# Patient Record
Sex: FEMALE | Race: WHITE | NOT HISPANIC OR LATINO | Employment: UNEMPLOYED | URBAN - METROPOLITAN AREA
[De-identification: names, ages, dates, MRNs, and addresses within clinical notes are randomized per-mention and may not be internally consistent; named-entity substitution may affect disease eponyms.]

---

## 2023-03-31 ENCOUNTER — DOCUMENTATION (OUTPATIENT)
Dept: HEMATOLOGY ONCOLOGY | Facility: CLINIC | Age: 53
End: 2023-03-31

## 2023-03-31 NOTE — PROGRESS NOTES
Intake received, chart reviewed for need of external records      Pathology requested:   From [facility] via fax [phone], phone [phone]    Images requested:  From [facility] via fax [phone], phone [phone]    Routed to consulting providers team

## 2023-04-03 ENCOUNTER — DOCUMENTATION (OUTPATIENT)
Dept: HEMATOLOGY ONCOLOGY | Facility: CLINIC | Age: 53
End: 2023-04-03

## 2023-04-03 NOTE — PROGRESS NOTES
Records received from outside facility  Outside Pathology records received from : [facility]    Records sent to Pathology attention Neal Lewis   Note routed to team

## 2023-04-04 ENCOUNTER — DOCUMENTATION (OUTPATIENT)
Dept: HEMATOLOGY ONCOLOGY | Facility: CLINIC | Age: 53
End: 2023-04-04

## 2023-04-04 NOTE — PROGRESS NOTES
Records received from outside facility  Outside Pathology/Images records received from : [facility]    Records sent to Radiology attention   [facility] Note routed to team

## 2023-04-05 ENCOUNTER — HOSPITAL ENCOUNTER (OUTPATIENT)
Dept: RADIOLOGY | Facility: HOSPITAL | Age: 53
Discharge: HOME | End: 2023-04-05
Attending: RADIOLOGY

## 2023-04-05 DIAGNOSIS — Z76.89 REFERRAL OF PATIENT WITHOUT EXAMINATION OR TREATMENT: ICD-10-CM

## 2023-04-24 ENCOUNTER — CONSULT (OUTPATIENT)
Dept: HEMATOLOGY ONCOLOGY | Facility: CLINIC | Age: 53
End: 2023-04-24

## 2023-04-24 VITALS
OXYGEN SATURATION: 97 % | HEART RATE: 105 BPM | HEIGHT: 67 IN | WEIGHT: 191 LBS | DIASTOLIC BLOOD PRESSURE: 100 MMHG | SYSTOLIC BLOOD PRESSURE: 174 MMHG | RESPIRATION RATE: 17 BRPM | BODY MASS INDEX: 29.98 KG/M2

## 2023-04-24 DIAGNOSIS — Z17.0 MALIGNANT NEOPLASM OF OVERLAPPING SITES OF BOTH BREASTS IN FEMALE, ESTROGEN RECEPTOR POSITIVE (HCC): Primary | ICD-10-CM

## 2023-04-24 DIAGNOSIS — C50.812 MALIGNANT NEOPLASM OF OVERLAPPING SITES OF BOTH BREASTS IN FEMALE, ESTROGEN RECEPTOR POSITIVE (HCC): Primary | ICD-10-CM

## 2023-04-24 DIAGNOSIS — I10 PRIMARY HYPERTENSION: ICD-10-CM

## 2023-04-24 DIAGNOSIS — C50.811 MALIGNANT NEOPLASM OF OVERLAPPING SITES OF BOTH BREASTS IN FEMALE, ESTROGEN RECEPTOR POSITIVE (HCC): Primary | ICD-10-CM

## 2023-04-24 DIAGNOSIS — G89.3 CANCER RELATED PAIN: ICD-10-CM

## 2023-04-24 DIAGNOSIS — F32.A DEPRESSION, UNSPECIFIED DEPRESSION TYPE: ICD-10-CM

## 2023-04-24 RX ORDER — LISINOPRIL 20 MG/1
20 TABLET ORAL DAILY
COMMUNITY

## 2023-04-24 RX ORDER — CETIRIZINE HYDROCHLORIDE 5 MG/1
5 TABLET ORAL DAILY
COMMUNITY

## 2023-04-24 RX ORDER — TAMOXIFEN CITRATE 20 MG/1
TABLET ORAL
COMMUNITY

## 2023-04-24 RX ORDER — GABAPENTIN 300 MG/1
300 CAPSULE ORAL 3 TIMES DAILY
COMMUNITY

## 2023-04-24 RX ORDER — VENLAFAXINE HYDROCHLORIDE 75 MG/1
75 CAPSULE, EXTENDED RELEASE ORAL DAILY
COMMUNITY

## 2023-04-24 NOTE — LETTER
April 25, 2023     Referral Self    Patient: Laurie Gomez   YOB: 1970   Date of Visit: 4/24/2023       Dear Dr Jaquan Kwok:    Thank you for referring Laurie Gomez to me for evaluation  Below are my notes for this consultation  If you have questions, please do not hesitate to call me  I look forward to following your patient along with you  Sincerely,        Nancy Hickman MD        CC: Anthony Olson MD  4/25/2023 12:18 AM  Sign when Signing Visit  Consultation - Medical Oncology   Laurie Gomez 47 y o  female MRN: [id number]  Unit/Bed#:  Encounter: [id number]  Self referral  Date of service: 4/24/2023  Reason for Consult: Bilateral breast cancers  HPI: Laurie Gomez is a 47y o  year old female  Transfer of care from [facility] to this area  Patient has moved back to this area  Records not available except report of Oncotype and that was 15  Patient states that in July 2020 she was diagnosed to have bilateral breast cancers and had bilateral mastectomies  She states she had invasive ductal carcinoma that was larger on the right side than left side  She states stage was 3 and lymph nodes were positive in right axilla but she does not know how many  After surgery she had radiation and she thinks that was given to the right side and she had that from October 2022- December 2020  Since that time she has been on tamoxifen 20 mg daily  Since surgery she has some pain in the mastectomy scars and scars were revised twice  She states gabapentin is helping and also she takes a CBD  She gives history of depression and has been on Effexor ER 75  Has  history of hypertension and seasonal allergies  Menarche at 16  She has 1 son and that was born when she was 30 and prior to that she had 2 miscarriages and had fertility treatments    Patient states her last menstrual period was the day of surgery in 2020 and 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  She quit smoking "cigarettes in 2005 after smoking 1/3 pack of cigarette a day for 20 years  No alcohol  Sister has history of skin cancer  Maternal grandfather and paternal grandmother had lung cancers and they smoked cigarettes  Patient is allergic to latex and penicillin  ROS:  Reviewed 12 systems: See symptoms in HPI  Presently no other neurological, cardiac, pulmonary, GI and  symptoms other than listed in HPI  Other symptoms are in HPI  No  fever, chills, bleeding, bone pains, skin rash, weight loss, night sweats, arthritic symptoms,  tiredness , weakness, numbness, claudication and gait problem  No frequent infections  Not unusually sensitive to heat or cold  No swelling of the ankles  No swollen glands  Patient is anxious  Historical Information   No past medical history on file  No past surgical history on file  Social History   Social History     Substance and Sexual Activity   Alcohol Use Not on file     Social History     Substance and Sexual Activity   Drug Use Not on file     Social History     Tobacco Use   Smoking Status Not on file   Smokeless Tobacco Not on file     Family History: No family history on file        Current Outpatient Medications:   •  cetirizine (ZyrTEC) 5 MG tablet, Take 5 mg by mouth daily, Disp: , Rfl:   •  gabapentin (NEURONTIN) 300 mg capsule, Take 300 mg by mouth 3 (three) times a day, Disp: , Rfl:   •  lisinopril (ZESTRIL) 20 mg tablet, Take 20 mg by mouth daily, Disp: , Rfl:   •  tamoxifen (NOLVADEX) 20 mg tablet, , Disp: , Rfl:   •  venlafaxine (EFFEXOR-XR) 75 mg 24 hr capsule, Take 75 mg by mouth daily, Disp: , Rfl:     Not on File    Physical Exam: My medical assistant Rajinder Contreras was with me in the room during examination  Vitals:    04/24/23 0947   BP: (!) 174/100   BP Location: Left arm   Patient Position: Sitting   Cuff Size: Adult   Pulse: 105   Resp: 17   SpO2: 97%   Weight: 86 6 kg (191 lb)   Height: 5' 7\" (1 702 m)     Alert, oriented, not in distress, vitals are " above and blood pressure is high, no icterus, no oral thrush, no palpable neck mass, clear lung fields, regular heart rate, abdomen  soft and non tender, no palpable abdominal mass, no ascites, no edema of ankles, no calf tenderness, no focal neurological deficit, no skin rash, no palpable lymphadenopathy in the neck and axillary areas,  no clubbing  Patient is anxious  Performance status 0  Bilateral mastectomy scars and no palpable nodule in the scars  No lymphedema  Lab Results: No reported other than Oncotype score was 15  LABS:  No results found for this or any previous visit  Imaging Studies: No report  Pathology, and Other Studies: No reported other than Oncotype score was 15      Assessment and Plan:  See diagnoses, orders instructions below   Transfer of care from [facility] to this area  Patient has moved back to this area  Records not available except report of Oncotype and that was 15  Patient states that in July 2020 she was diagnosed to have bilateral breast cancers and had bilateral mastectomies  She states she had invasive ductal carcinoma that was larger on the right side than left side  She states stage was 3 and lymph nodes were positive in right axilla but she does not know how many  After surgery she had radiation and she thinks that was given to the right side and she had that from October 2022- December 2020  Since that time she has been on tamoxifen 20 mg daily  Since surgery she has some pain in the mastectomy scars and scars were revised twice  She states gabapentin is helping and also she takes a CBD  She gives history of depression and has been on Effexor ER 75  Has  history of hypertension and seasonal allergies  Menarche at 16  She has 1 son and that was born when she was 30 and prior to that she had 2 miscarriages and had fertility treatments    Patient states her last menstrual period was the day of surgery in 2020 and now she has tamoxifen induced "menopause  She quit smoking cigarettes in 2005 after smoking 1/3 pack of cigarette a day for 20 years  No alcohol  Sister has history of skin cancer  Maternal grandfather and paternal grandmother had lung cancers and they smoked cigarettes  Patient is allergic to latex and penicillin  Physical examination as recorded and discussed  Our office will try to get records from [address] [place] [address]  Patient states she was seen by her medical oncologist and radiation oncologist recently in [facility] and had blood work and everything was \"normal\"  She has prescriptions for medications for 3 months  She has signed informed consent for tamoxifen  She has been referred to palliative care for symptom management of depression and pain and to family practice for care of her other medical problems especially high blood pressure  She will monitor her blood pressure at home and if continued to rise she will report to the emergency room  He states her blood pressure is high today because of new doctor's office  Ordered blood work  Discussed healthy diet and activities as tolerated and health screening test   Patient is capable of self-care  Goal is cure from breast cancers  She states her genetic test was negative  I discussed in detail  Questions answered  1  Malignant neoplasm of overlapping sites of both breasts in female, estrogen receptor positive ([facility] [address] )    - CBC and differential; Future  - Comprehensive metabolic panel; Future  - Cancer antigen 27 29; Future  - Ambulatory referral to Palliative Care; Future  - Ambulatory referral to [facility]; Future    2  Primary hypertension    - Ambulatory referral to [facility]; Future    3  Depression, unspecified depression type    - Ambulatory referral to Palliative Care; Future  - Ambulatory referral to [facility]; Future    4  Cancer related pain    - Ambulatory referral to Palliative Care; Future  - Ambulatory referral to [facility];  Future     " Patient will continue to follow with  primary physician and other consultants  Patient voiced understanding and agrees      Disclaimer: This document was prepared using a dictation device  If a word or phrase is confusing, or does not make sense, this is likely due to recognition error which was not discovered during the providers review  If you believe an error has occurred, please Contact me through [employer] service for ryley? cation  Counseling / Coordination of Care    Isabel Hillman   Provided counseling and support

## 2023-04-24 NOTE — PATIENT INSTRUCTIONS
Informed consent for tamoxifen  Referral to palliative care  Referral to family practice  Follow-up in 3 months

## 2023-04-25 ENCOUNTER — LAB REQUISITION (OUTPATIENT)
Dept: LAB | Facility: HOSPITAL | Age: 53
End: 2023-04-25

## 2023-04-25 DIAGNOSIS — C50.811 MALIGNANT NEOPLASM OF OVERLAPPING SITES OF RIGHT FEMALE BREAST (HCC): ICD-10-CM

## 2023-04-25 DIAGNOSIS — Z17.0 ESTROGEN RECEPTOR POSITIVE STATUS (ER+): ICD-10-CM

## 2023-04-25 DIAGNOSIS — C50.919 MALIGNANT NEOPLASM OF UNSPECIFIED SITE OF UNSPECIFIED FEMALE BREAST (HCC): ICD-10-CM

## 2023-04-25 DIAGNOSIS — C50.812 MALIGNANT NEOPLASM OF OVERLAPPING SITES OF LEFT FEMALE BREAST (HCC): ICD-10-CM

## 2023-04-25 NOTE — PROGRESS NOTES
Consultation - Medical Oncology   Jesu Rice 47 y o  female MRN: [id number]  Unit/Bed#:  Encounter: [id number]  Self referral  Date of service: 4/24/2023  Reason for Consult: Bilateral breast cancers  HPI: Jesu Rice is a 47y o  year old female  Transfer of care from [facility] to this area  Patient has moved back to this area  Records not available except report of Oncotype and that was 15  Patient states that in July 2020 she was diagnosed to have bilateral breast cancers and had bilateral mastectomies  She states she had invasive ductal carcinoma that was larger on the right side than left side  She states stage was 3 and lymph nodes were positive in right axilla but she does not know how many  After surgery she had radiation and she thinks that was given to the right side and she had that from October 2022- December 2020  Since that time she has been on tamoxifen 20 mg daily  Since surgery she has some pain in the mastectomy scars and scars were revised twice  She states gabapentin is helping and also she takes a CBD  She gives history of depression and has been on Effexor ER 75  Has  history of hypertension and seasonal allergies  Menarche at 16  She has 1 son and that was born when she was 30 and prior to that she had 2 miscarriages and had fertility treatments  Patient states her last menstrual period was the day of surgery in 2020 and 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  She quit smoking cigarettes in 2005 after smoking 1/3 pack of cigarette a day for 20 years  No alcohol  Sister has history of skin cancer  Maternal grandfather and paternal grandmother had lung cancers and they smoked cigarettes  Patient is allergic to latex and penicillin  ROS:  Reviewed 12 systems: See symptoms in HPI  Presently no other neurological, cardiac, pulmonary, GI and  symptoms other than listed in HPI  Other symptoms are in HPI    No  fever, chills, bleeding, bone pains, skin rash, "weight loss, night sweats, arthritic symptoms,  tiredness , weakness, numbness, claudication and gait problem  No frequent infections  Not unusually sensitive to heat or cold  No swelling of the ankles  No swollen glands  Patient is anxious  Historical Information   No past medical history on file  No past surgical history on file  Social History   Social History     Substance and Sexual Activity   Alcohol Use Not on file     Social History     Substance and Sexual Activity   Drug Use Not on file     Social History     Tobacco Use   Smoking Status Not on file   Smokeless Tobacco Not on file     Family History: No family history on file  Current Outpatient Medications:   •  cetirizine (ZyrTEC) 5 MG tablet, Take 5 mg by mouth daily, Disp: , Rfl:   •  gabapentin (NEURONTIN) 300 mg capsule, Take 300 mg by mouth 3 (three) times a day, Disp: , Rfl:   •  lisinopril (ZESTRIL) 20 mg tablet, Take 20 mg by mouth daily, Disp: , Rfl:   •  tamoxifen (NOLVADEX) 20 mg tablet, , Disp: , Rfl:   •  venlafaxine (EFFEXOR-XR) 75 mg 24 hr capsule, Take 75 mg by mouth daily, Disp: , Rfl:     Not on File    Physical Exam: My medical assistant Indira Caro was with me in the room during examination  Vitals:    04/24/23 0947   BP: (!) 174/100   BP Location: Left arm   Patient Position: Sitting   Cuff Size: Adult   Pulse: 105   Resp: 17   SpO2: 97%   Weight: 86 6 kg (191 lb)   Height: 5' 7\" (1 702 m)     Alert, oriented, not in distress, vitals are above and blood pressure is high, no icterus, no oral thrush, no palpable neck mass, clear lung fields, regular heart rate, abdomen  soft and non tender, no palpable abdominal mass, no ascites, no edema of ankles, no calf tenderness, no focal neurological deficit, no skin rash, no palpable lymphadenopathy in the neck and axillary areas,  no clubbing  Patient is anxious  Performance status 0  Bilateral mastectomy scars and no palpable nodule in the scars  No lymphedema        Lab " Results: No reported other than Oncotype score was 15  LABS:  No results found for this or any previous visit  Imaging Studies: No report  Pathology, and Other Studies: No reported other than Oncotype score was 15      Assessment and Plan:  See diagnoses, orders instructions below   Transfer of care from [facility] to this area  Patient has moved back to this area  Records not available except report of Oncotype and that was 15  Patient states that in July 2020 she was diagnosed to have bilateral breast cancers and had bilateral mastectomies  She states she had invasive ductal carcinoma that was larger on the right side than left side  She states stage was 3 and lymph nodes were positive in right axilla but she does not know how many  After surgery she had radiation and she thinks that was given to the right side and she had that from October 2022- December 2020  Since that time she has been on tamoxifen 20 mg daily  Since surgery she has some pain in the mastectomy scars and scars were revised twice  She states gabapentin is helping and also she takes a CBD  She gives history of depression and has been on Effexor ER 75  Has  history of hypertension and seasonal allergies  Menarche at 16  She has 1 son and that was born when she was 30 and prior to that she had 2 miscarriages and had fertility treatments  Patient states her last menstrual period was the day of surgery in 2020 and 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  She quit smoking cigarettes in 2005 after smoking 1/3 pack of cigarette a day for 20 years  No alcohol  Sister has history of skin cancer  Maternal grandfather and paternal grandmother had lung cancers and they smoked cigarettes  Patient is allergic to latex and penicillin  Physical examination as recorded and discussed  Our office will try to get records from [facility]    Patient states she was seen by her medical oncologist and radiation oncologist recently [place] and had blood work and everything was \"normal\"  She has prescriptions for medications for 3 months  She has signed informed consent for tamoxifen  She has been referred to palliative care for symptom management of depression and pain and to family practice for care of her other medical problems especially high blood pressure  She will monitor her blood pressure at home and if continued to rise she will report to the emergency room  He states her blood pressure is high today because of new doctor's office  Ordered blood work  Discussed healthy diet and activities as tolerated and health screening test   Patient is capable of self-care  Goal is cure from breast cancers  She states her genetic test was negative  I discussed in detail  Questions answered  1  Malignant neoplasm of overlapping sites of both breasts in female, estrogen receptor positive ([facility] [address] )    - CBC and differential; Future  - Comprehensive metabolic panel; Future  - Cancer antigen 27 29; Future  - Ambulatory referral to Palliative Care; Future  - Ambulatory referral to [facility]; Future    2  Primary hypertension    - Ambulatory referral to [facility]; Future    3  Depression, unspecified depression type    - Ambulatory referral to Palliative Care; Future  - Ambulatory referral to [facility]; Future    4  Cancer related pain    - Ambulatory referral to Palliative Care; Future  - Ambulatory referral to [facility]; Future     Patient will continue to follow with  primary physician and other consultants  Patient voiced understanding and agrees      Disclaimer: This document was prepared using a dictation device  If a word or phrase is confusing, or does not make sense, this is likely due to recognition error which was not discovered during the providers review  If you believe an error has occurred, please Contact me through [employer] service for ryley? cation  Counseling / Coordination of Care    Aamiryne Record  " Provided counseling and support

## 2023-05-05 ENCOUNTER — TELEPHONE (OUTPATIENT)
Dept: HEMATOLOGY ONCOLOGY | Facility: CLINIC | Age: 53
End: 2023-05-05

## 2023-05-05 NOTE — TELEPHONE ENCOUNTER
Returned telephone call  Left voice message stating I received her message but I am unable to understand the message  Instructed to call me back

## 2023-06-28 ENCOUNTER — CONSULT (OUTPATIENT)
Dept: PALLIATIVE MEDICINE | Facility: CLINIC | Age: 53
End: 2023-06-28

## 2023-06-28 VITALS
WEIGHT: 191.5 LBS | BODY MASS INDEX: 29.99 KG/M2 | HEART RATE: 91 BPM | TEMPERATURE: 97.8 F | SYSTOLIC BLOOD PRESSURE: 118 MMHG | OXYGEN SATURATION: 99 % | DIASTOLIC BLOOD PRESSURE: 80 MMHG

## 2023-06-28 DIAGNOSIS — G89.3 CANCER RELATED PAIN: ICD-10-CM

## 2023-06-28 DIAGNOSIS — Z17.0 MALIGNANT NEOPLASM OF OVERLAPPING SITES OF BOTH BREASTS IN FEMALE, ESTROGEN RECEPTOR POSITIVE (HCC): Primary | ICD-10-CM

## 2023-06-28 DIAGNOSIS — Z79.899 MEDICAL MARIJUANA USE: ICD-10-CM

## 2023-06-28 DIAGNOSIS — Z71.89 ADVANCED CARE PLANNING/COUNSELING DISCUSSION: ICD-10-CM

## 2023-06-28 DIAGNOSIS — Z71.89 GOALS OF CARE, COUNSELING/DISCUSSION: ICD-10-CM

## 2023-06-28 DIAGNOSIS — C50.812 MALIGNANT NEOPLASM OF OVERLAPPING SITES OF BOTH BREASTS IN FEMALE, ESTROGEN RECEPTOR POSITIVE (HCC): Primary | ICD-10-CM

## 2023-06-28 DIAGNOSIS — C50.811 MALIGNANT NEOPLASM OF OVERLAPPING SITES OF BOTH BREASTS IN FEMALE, ESTROGEN RECEPTOR POSITIVE (HCC): Primary | ICD-10-CM

## 2023-06-28 DIAGNOSIS — Z51.5 PALLIATIVE CARE PATIENT: ICD-10-CM

## 2023-06-28 DIAGNOSIS — F41.9 ANXIOUSNESS: ICD-10-CM

## 2023-06-28 DIAGNOSIS — F32.A DEPRESSION, UNSPECIFIED DEPRESSION TYPE: ICD-10-CM

## 2023-06-28 DIAGNOSIS — G89.28 CHRONIC POST-OPERATIVE PAIN: ICD-10-CM

## 2023-06-28 DIAGNOSIS — G47.00 INSOMNIA: ICD-10-CM

## 2023-06-28 DIAGNOSIS — R63.0 LOSS OF APPETITE: ICD-10-CM

## 2023-06-28 RX ORDER — GABAPENTIN 300 MG/1
CAPSULE ORAL
Qty: 90 CAPSULE | Refills: 2 | Status: SHIPPED | OUTPATIENT
Start: 2023-06-28

## 2023-06-28 RX ORDER — VENLAFAXINE HYDROCHLORIDE 75 MG/1
75 CAPSULE, EXTENDED RELEASE ORAL DAILY
Qty: 30 CAPSULE | Refills: 2 | Status: SHIPPED | OUTPATIENT
Start: 2023-06-28

## 2023-06-28 NOTE — PATIENT INSTRUCTIONS
It was good to see you today  Thank you for coming in  Every person needs a primacy care provider (PCP) for health maintenance, management of hypertension, etc  Dr Lary Delgado referred you on 4/24/23  Call Conchis Mendoza ([phone]) or go online ([email or web address] to find a PCP  We discussed medical marijuana (MMJ) today and I have issued a certification for its use  You should expect an e-mail from the [place] Dept of Health verifying this  They will mail you your [facility] certification card  You and I reviewed the MMJ informed consent document today  We are providing you with a copy of the signed document  List of dispensaries provided  Once you have your MMJ card, please call your preferred dispensary to set up an appointment with the dispensary pharmacist to review symptoms and product options  Symptoms to discuss: chronic pain, loss of appetite, anxiousness, insomnia, and promoting a sense of well being  I recommend you ask family or friends to register as an [facility] caregiver (otherwise no one can go into the dispensary with you)  They register on the same site you did (www [email or web address] [email or web address])  They may need a background check  Continue gabapentin, continue venlafaxine  Please bring any completed advanced directives (POLST, Living Will, and/or healthcare Power of  documents, etc) in to any [facility] facility; staff can scan it into your chart  Return in about 3 months (around 9/28/2023)  Call us for refills on medications that we supply, as needed  If something changes and you need to come in sooner, please call our office  PRESCRIPTION REFILL REMINDER:  All medication refills should be requested prior to [facility] on Friday  Any refill requests after noon on Friday would be addressed the following Monday      MEDICATION SAFETY ISSUES:  Do not drive under the influence of narcotics (including opioids), watch for adverse effects including confusion / altered mental status / respiratory depression (slowed breathing), keep medications stored in a safe/locked environment, do not use alcohol while opioids or other narcotics are in your system

## 2023-06-28 NOTE — PROGRESS NOTES
Consult to Palliative and Supportive Care  Jimy Woodson 47 y o  female [id number]    ASSESSMENT & PLAN:  1  Malignant neoplasm of overlapping sites of both breasts in female, estrogen receptor positive ([address] )    2  Depression, unspecified depression type    3  Cancer related pain    4  Goals of care, counseling/discussion    5  Advanced care planning/counseling discussion    6  Chronic post-operative pain    7  Anxiousness    8  Insomnia    9  Loss of appetite    10  Medical marijuana use    11  Palliative care patient          • Time spent introducing the concept of palliative care in general, and the [facility] in specific  Assessed patient's understanding of her palliative diagnosis and current plan of treatment  • Continue disease-directed cares  Patient wishes to pursue any offered treatment which might prolong her life or reduce symptom burden  Patient states that she is an organ donor and consents   • ACP: Patient has completed advanced directives (Living Will and Power of ) naming her son as default surrogate healthcare decision-maker(s)  Advanced directive counseling given  Asked patient to bring documentation in to any [facility] visit be scanned into the EMR  • Patient has not yet established with a local PCP  Reprinted [facility] referral Dr Sorin Putnam generated in April  Counseled  Provided information on the [facility] website and Infolink to assist   • Gabapentin is helpful for patient's chronic pain  She would prefer to avoid opioids  • Recommended patient consider topical OTC products, local application of heat or cold, Tylenol up to 1000mg TID for chronic pain  Do not use heat on top of topical agents  Do not mix topical agents  • Continue venlafaxine for depression, anxiousness  • The patient qualifies for use of MMJ in the [facility] by having the following medical condition: breast cancer    • From a palliative care standpoint the patient is suffering with chronic pain, loss of appetite, anxiousness, insomnia, depressed mood  These symptoms and side effects might be alleviated with use of MMJ products  The patient registered online (patient ID #[id number])  The patient read and I reviewed the informed consent document with the patient  I answered all questions related to it before the patient signed it  The patient's medical certification was completed on this date (certification #[id number])  The patient was given a signed copy of the informed consent and medical certification  • The patient accepts extensive counseling today on MMJ risks and benefits, legal concerns, possible adverse effects, routes of administration, active ingredients such as THC+CBD, etc   • I issued a certification for MMJ use with palliative intent, to help alleviate cancer-related symptoms and cancer-treatment-related side effects  I do not endorse the belief that MMJ can treat cancer and strongly encouraged the patient to continue treatments and surveillance as recommend by cancer specialists  • I suggested the patient ask family or friends to register as an MMJ caregiver so they can go with the patient to the dispensary, or in the patient's stead  Information provided on the [facility] caregiver program   • Patient used MMJ in [place] w/ good results; counseled that the available products in [place] may be different  • Reviewed notes (Medical Oncology), labs (4/25/23 pathology referral w/ 2020 pathology results), imaging + procedures (6/25/23 MRI bilateral breast)  • Return in about 3 months (around 9/28/2023)  • Emotional support provided  • Medication safety issues addressed - no driving under the influence of narcotics (including opioids), watch for adverse effects including AMS or respiratory depression (slowed breathing), keep medications stored in a safe/locked environment, do not use alcohol while opioids or other narcotics are in one's system        Requested Prescriptions     Signed Prescriptions Disp Refills   • "gabapentin (NEURONTIN) 300 mg capsule 90 capsule 2     Sig: Take one capsule (300mg) in the morning and two capsules (600mg) at bedtime   • venlafaxine (EFFEXOR-XR) 75 mg 24 hr capsule 30 capsule 2     Sig: Take 1 capsule (75 mg total) by mouth daily       Medications Discontinued During This Encounter   Medication Reason   • venlafaxine (EFFEXOR-XR) 75 mg 24 hr capsule Reorder   • gabapentin (NEURONTIN) 300 mg capsule Reorder       Representatives have queried the patient's controlled substance dispensing history in the Prescription Drug Monitoring Program in compliance with regulations before I have prescribed any controlled substances  The prescription history is consistent with prescribed therapy and our practice policies  45+ minutes were spent in this ambulatory visit with greater than 50% of the time spent face to face with patient in counseling or coordination of care including symptom assessment and management, medication review, psychosocial support, chart review, imaging review, lab review, advanced directives, goals of care, medical marijuana, supportive listening and anticipatory guidance  All of the patient's questions were answered during this discussion  SUBJECTIVE:  Chief Complaint   Patient presents with   • Cancer   • Cancer Pain   • Loss of Appetite   • Depression   • Counseling   • Consult   • Goals        HPI    Bella Armijo is a 47 y o  female w/ bilateral breast cancer (diagnosed 07/2020 as invasive ductal carcinoma) s/p bilateral mastectomies + RT, on tamoxifen; depression, HTN, seasonal allergies  She follows w/ Dr Scott Meehan (Medical Oncology)  She recently transferred care from [facility] to the [facility] (moved to [place] in 10/2022)  Patient is new to [facility] clinic; referred by Medical Oncology for symptom management / MMJ evaluation      Patient states she had been a user of MMJ in [place]; edibles / Ardean Spikes / \"sugar\" were quite helpful for appetite stimulation, " promoting sleep, decreasing anxiousness, and reducing chronic pain  She is hopeful that she can find similar products here in [place]  She is amenable to trying topical products, PO tinctures or other PO products, but would prefer to avoid smokables  Patient has been tolerating venlafaxine for mood  She reports gabapentin is helpful for pain; she takes 300mg in the AM and 600mg in the evening  Her chronic pain seems to be primarily post-operative / cancer-related and has a neuropathic component  She would prefer to avoid opioids  Patient is well-supported by her son (who is not local) and her significant other (who is local)  She states she has completed advanced directives naming her son as her agent  PDMP shows no concerns  The following portions of the medical history were reviewed: past medical history, surgical history, problem list, medication list, family history, and social history  History reviewed  No pertinent past medical history  History reviewed  No pertinent surgical history  Social History     Socioeconomic History   • Marital status:      Spouse name: None   • Number of children: None   • Years of education: None   • Highest education level: None   Occupational History   • None   Tobacco Use   • Smoking status: Former     Types: Cigarettes   • Smokeless tobacco: None   • Tobacco comments:     quit smoking cigarettes in 2005 after smoking 1/3 pack of cigarette a day for 20 years   Substance and Sexual Activity   • Alcohol use: Never   • Drug use: None   • Sexual activity: None   Other Topics Concern   • None   Social History Narrative    Per 4/24/23 Oncology note: Patient has 1 son and that was born when she was 30 and prior to that she had 2 miscarriages and had fertility treatments         Social Determinants of Health     Financial Resource Strain: Not on file   Food Insecurity: Not on file   Transportation Needs: Not on file   Physical Activity: Not on file   Stress: Not on file   Social Connections: Not on file   Intimate Partner Violence: Not on file   Housing Stability: Not on file     Family History   Problem Relation Age of Onset   • Skin cancer Sister    • Lung cancer Maternal Grandfather    • Lung cancer Paternal Grandmother        Current Outpatient Medications:   •  cetirizine (ZyrTEC) 5 MG tablet, Take 5 mg by mouth daily, Disp: , Rfl:   •  gabapentin (NEURONTIN) 300 mg capsule, Take one capsule (300mg) in the morning and two capsules (600mg) at bedtime, Disp: 90 capsule, Rfl: 2  •  lisinopril (ZESTRIL) 20 mg tablet, Take 20 mg by mouth daily, Disp: , Rfl:   •  tamoxifen (NOLVADEX) 20 mg tablet, , Disp: , Rfl:   •  venlafaxine (EFFEXOR-XR) 75 mg 24 hr capsule, Take 1 capsule (75 mg total) by mouth daily, Disp: 30 capsule, Rfl: 2    Review of Systems   Constitutional: Positive for appetite change  Musculoskeletal: Positive for myalgias  Allergic/Immunologic: Positive for immunocompromised state  Neurological:        Neuropathy / nerve pain s/t procedure  Psychiatric/Behavioral: Positive for dysphoric mood and sleep disturbance  The patient is nervous/anxious  All other systems reviewed and are negative  OBJECTIVE:  /80 (BP Location: Left arm, Patient Position: Sitting, Cuff Size: Standard)   Pulse 91   Temp 97 8 °F (36 6 °C) (Temporal)   Wt 86 9 kg (191 lb 8 oz)   SpO2 99%   BMI 29 99 kg/m²   Physical Exam  Vitals reviewed  Constitutional:       General: She is not in acute distress  Appearance: She is well-groomed and overweight  She is not toxic-appearing  HENT:      Head: Normocephalic and atraumatic  Right Ear: External ear normal       Left Ear: External ear normal    Eyes:      General: No scleral icterus  Right eye: No discharge  Left eye: No discharge  Extraocular Movements: Extraocular movements intact        Conjunctiva/sclera: Conjunctivae normal       Pupils: Pupils are equal, round, and reactive to "light  Cardiovascular:      Rate and Rhythm: Normal rate  Pulmonary:      Effort: Pulmonary effort is normal  No tachypnea, bradypnea, accessory muscle usage or respiratory distress  Comments: Able to speak comfortably in complete sentences on room air at rest   Abdominal:      General: There is no distension  Tenderness: There is no guarding  Musculoskeletal:      Cervical back: Normal range of motion  Right lower leg: No edema  Left lower leg: No edema  Skin:     General: Skin is dry  Coloration: Skin is not pale  Neurological:      Mental Status: She is alert and oriented to person, place, and time  Cranial Nerves: No dysarthria or facial asymmetry  Gait: Gait normal       Comments: Using no assistive devices for ambulation  Psychiatric:         Attention and Perception: Attention normal          Mood and Affect: Mood and affect normal          Speech: Speech normal          Behavior: Behavior normal  Behavior is cooperative  Thought Content: Thought content normal          Cognition and Memory: Cognition and memory normal          Judgment: Judgment normal           Ricardo Shelley MD  [facility]iative [facility] Supportive [facility]  [phone]    Portions of this document may have been created using dictation software and as such some \"sound alike\" terms may have been generated by the system  Do not hesitate to contact me with any questions or clarifications     "

## 2023-07-18 ENCOUNTER — TELEPHONE (OUTPATIENT)
Dept: HEMATOLOGY ONCOLOGY | Facility: CLINIC | Age: 53
End: 2023-07-18

## 2023-07-18 NOTE — TELEPHONE ENCOUNTER
Spoke with patient reminding to have labs drawn prior to appointment, patient verbalized understanding.

## 2023-07-19 ENCOUNTER — APPOINTMENT (OUTPATIENT)
Dept: LAB | Facility: CLINIC | Age: 53
End: 2023-07-19
Payer: COMMERCIAL

## 2023-07-19 DIAGNOSIS — C50.811 MALIGNANT NEOPLASM OF OVERLAPPING SITES OF BOTH BREASTS IN FEMALE, ESTROGEN RECEPTOR POSITIVE (HCC): ICD-10-CM

## 2023-07-19 DIAGNOSIS — Z17.0 MALIGNANT NEOPLASM OF OVERLAPPING SITES OF BOTH BREASTS IN FEMALE, ESTROGEN RECEPTOR POSITIVE (HCC): ICD-10-CM

## 2023-07-19 DIAGNOSIS — Z11.1 SCREENING EXAMINATION FOR PULMONARY TUBERCULOSIS: ICD-10-CM

## 2023-07-19 DIAGNOSIS — Z01.84 IMMUNITY STATUS TESTING: ICD-10-CM

## 2023-07-19 DIAGNOSIS — C50.812 MALIGNANT NEOPLASM OF OVERLAPPING SITES OF BOTH BREASTS IN FEMALE, ESTROGEN RECEPTOR POSITIVE (HCC): ICD-10-CM

## 2023-07-19 LAB
ALBUMIN SERPL BCP-MCNC: 4.4 G/DL (ref 3.5–5)
ALP SERPL-CCNC: 67 U/L (ref 34–104)
ALT SERPL W P-5'-P-CCNC: 16 U/L (ref 7–52)
ANION GAP SERPL CALCULATED.3IONS-SCNC: 5 MMOL/L
AST SERPL W P-5'-P-CCNC: 17 U/L (ref 13–39)
BASOPHILS # BLD AUTO: 0.05 THOUSANDS/ÂΜL (ref 0–0.1)
BASOPHILS NFR BLD AUTO: 1 % (ref 0–1)
BILIRUB SERPL-MCNC: 0.31 MG/DL (ref 0.2–1)
BUN SERPL-MCNC: 13 MG/DL (ref 5–25)
CALCIUM SERPL-MCNC: 9.3 MG/DL (ref 8.4–10.2)
CHLORIDE SERPL-SCNC: 103 MMOL/L (ref 96–108)
CO2 SERPL-SCNC: 30 MMOL/L (ref 21–32)
CREAT SERPL-MCNC: 0.64 MG/DL (ref 0.6–1.3)
EOSINOPHIL # BLD AUTO: 0.15 THOUSAND/ÂΜL (ref 0–0.61)
EOSINOPHIL NFR BLD AUTO: 2 % (ref 0–6)
ERYTHROCYTE [DISTWIDTH] IN BLOOD BY AUTOMATED COUNT: 12.5 % (ref 11.6–15.1)
GFR SERPL CREATININE-BSD FRML MDRD: 102 ML/MIN/1.73SQ M
GLUCOSE SERPL-MCNC: 80 MG/DL (ref 65–140)
HCT VFR BLD AUTO: 40.6 % (ref 34.8–46.1)
HGB BLD-MCNC: 13.3 G/DL (ref 11.5–15.4)
IMM GRANULOCYTES # BLD AUTO: 0.03 THOUSAND/UL (ref 0–0.2)
IMM GRANULOCYTES NFR BLD AUTO: 0 % (ref 0–2)
LYMPHOCYTES # BLD AUTO: 2.6 THOUSANDS/ÂΜL (ref 0.6–4.47)
LYMPHOCYTES NFR BLD AUTO: 26 % (ref 14–44)
MCH RBC QN AUTO: 30.2 PG (ref 26.8–34.3)
MCHC RBC AUTO-ENTMCNC: 32.8 G/DL (ref 31.4–37.4)
MCV RBC AUTO: 92 FL (ref 82–98)
MONOCYTES # BLD AUTO: 0.88 THOUSAND/ÂΜL (ref 0.17–1.22)
MONOCYTES NFR BLD AUTO: 9 % (ref 4–12)
NEUTROPHILS # BLD AUTO: 6.5 THOUSANDS/ÂΜL (ref 1.85–7.62)
NEUTS SEG NFR BLD AUTO: 62 % (ref 43–75)
NRBC BLD AUTO-RTO: 0 /100 WBCS
PLATELET # BLD AUTO: 317 THOUSANDS/UL (ref 149–390)
PMV BLD AUTO: 8.5 FL (ref 8.9–12.7)
POTASSIUM SERPL-SCNC: 4 MMOL/L (ref 3.5–5.3)
PROT SERPL-MCNC: 7.3 G/DL (ref 6.4–8.4)
RBC # BLD AUTO: 4.41 MILLION/UL (ref 3.81–5.12)
RUBV IGG SERPL IA-ACNC: 17.6 IU/ML
SODIUM SERPL-SCNC: 138 MMOL/L (ref 135–147)
WBC # BLD AUTO: 10.21 THOUSAND/UL (ref 4.31–10.16)

## 2023-07-19 PROCEDURE — 86735 MUMPS ANTIBODY: CPT

## 2023-07-19 PROCEDURE — 86480 TB TEST CELL IMMUN MEASURE: CPT

## 2023-07-19 PROCEDURE — 36415 COLL VENOUS BLD VENIPUNCTURE: CPT

## 2023-07-19 PROCEDURE — 80053 COMPREHEN METABOLIC PANEL: CPT

## 2023-07-19 PROCEDURE — 86765 RUBEOLA ANTIBODY: CPT

## 2023-07-19 PROCEDURE — 86300 IMMUNOASSAY TUMOR CA 15-3: CPT

## 2023-07-19 PROCEDURE — 85025 COMPLETE CBC W/AUTO DIFF WBC: CPT

## 2023-07-19 PROCEDURE — 86787 VARICELLA-ZOSTER ANTIBODY: CPT

## 2023-07-19 PROCEDURE — 86762 RUBELLA ANTIBODY: CPT

## 2023-07-20 LAB
CANCER AG27-29 SERPL-ACNC: 14 U/ML (ref 0–38.6)
MEV IGG SER QL IA: NORMAL
MUV IGG SER QL IA: NORMAL
VZV IGG SER QL IA: NORMAL

## 2023-07-21 LAB
GAMMA INTERFERON BACKGROUND BLD IA-ACNC: 0.03 IU/ML
M TB IFN-G BLD-IMP: NEGATIVE
M TB IFN-G CD4+ BCKGRND COR BLD-ACNC: 0 IU/ML
M TB IFN-G CD4+ BCKGRND COR BLD-ACNC: 0 IU/ML
MITOGEN IGNF BCKGRD COR BLD-ACNC: >10 IU/ML

## 2023-07-24 ENCOUNTER — OFFICE VISIT (OUTPATIENT)
Dept: HEMATOLOGY ONCOLOGY | Facility: CLINIC | Age: 53
End: 2023-07-24
Payer: COMMERCIAL

## 2023-07-24 VITALS
SYSTOLIC BLOOD PRESSURE: 130 MMHG | TEMPERATURE: 98 F | DIASTOLIC BLOOD PRESSURE: 78 MMHG | BODY MASS INDEX: 30.13 KG/M2 | HEIGHT: 67 IN | OXYGEN SATURATION: 99 % | HEART RATE: 78 BPM | RESPIRATION RATE: 18 BRPM | WEIGHT: 192 LBS

## 2023-07-24 DIAGNOSIS — Z17.0 MALIGNANT NEOPLASM OF OVERLAPPING SITES OF BOTH BREASTS IN FEMALE, ESTROGEN RECEPTOR POSITIVE (HCC): Primary | ICD-10-CM

## 2023-07-24 DIAGNOSIS — I10 PRIMARY HYPERTENSION: ICD-10-CM

## 2023-07-24 DIAGNOSIS — G47.00 INSOMNIA, UNSPECIFIED TYPE: ICD-10-CM

## 2023-07-24 DIAGNOSIS — F32.A DEPRESSION, UNSPECIFIED DEPRESSION TYPE: ICD-10-CM

## 2023-07-24 DIAGNOSIS — G89.3 CANCER RELATED PAIN: ICD-10-CM

## 2023-07-24 DIAGNOSIS — C50.811 MALIGNANT NEOPLASM OF OVERLAPPING SITES OF BOTH BREASTS IN FEMALE, ESTROGEN RECEPTOR POSITIVE (HCC): Primary | ICD-10-CM

## 2023-07-24 DIAGNOSIS — C50.812 MALIGNANT NEOPLASM OF OVERLAPPING SITES OF BOTH BREASTS IN FEMALE, ESTROGEN RECEPTOR POSITIVE (HCC): Primary | ICD-10-CM

## 2023-07-24 PROCEDURE — 99214 OFFICE O/P EST MOD 30 MIN: CPT | Performed by: INTERNAL MEDICINE

## 2023-07-24 RX ORDER — VENLAFAXINE HYDROCHLORIDE 75 MG/1
75 CAPSULE, EXTENDED RELEASE ORAL DAILY
Qty: 30 CAPSULE | Refills: 2 | Status: SHIPPED | OUTPATIENT
Start: 2023-07-24

## 2023-07-24 NOTE — PATIENT INSTRUCTIONS
Blood work, chest x-ray, ultrasound abdomen and bone scan prior to next visit in 4 months. No change in tamoxifen. Prescribed Effexor XR 75 mg. She already has this medicine from palliative care specialist that I did not realize. She knows this is the same medication and she will not duplicate.

## 2023-07-24 NOTE — PROGRESS NOTES
HPI:  Follow-up visit for bilateral breast cancers diagnosed in July 2020 and patient had bilateral mastectomies. She stated genetic testing was negative. She states she had invasive ductal carcinoma that was larger on the right side than left side. She states stage was 3 and lymph nodes were positive in right axilla but she does not know how many. After surgery she had radiation and she thinks that was given to the right side and she had that from October 2022- December 2020. Since that time she has been on tamoxifen 20 mg daily. She states she did not receive tamoxifen when she was getting radiation. Patient stopped having menstrual periods right after surgery even before she started tamoxifen. No menstrual periods in last 2 years. She could be switched to aromatase inhibitor but she states her doctor in [facility] wanted her to take tamoxifen for 5 years and then letrozole for 5 years. Patient is advised to follow-up with a gynecologist because of tamoxifen and even otherwise. Since surgery she has some pain in the mastectomy scars and scars were revised twice. She states gabapentin is helping and also she takes a CBD. She gives history of depression and has been on Effexor ER 75. Has  history of hypertension and seasonal allergies. Menarche at 16. She has 1 son and that was born when she was 30 and prior to that she had 2 miscarriages and had fertility treatments. She quit smoking cigarettes in 2005 after smoking 1/3 pack of cigarette a day for 20 years. No alcohol. Sister has history of skin cancer. Maternal grandfather and paternal grandmother had lung cancers and they smoked cigarettes. Patient is allergic to latex and penicillin. ROS:  Reviewed 12 systems: See symptoms in HPI  Presently no other neurological, cardiac, pulmonary, GI and  symptoms other than listed in HPI. Other symptoms are in HPI.   No other symptoms like fever, chills, bleeding, bone pains, skin rash, weight loss, night sweats, arthritic symptoms,  tiredness , weakness, numbness, claudication and gait problem. No frequent infections. Not unusually sensitive to heat or cold. No swelling of the ankles. No swollen glands. Patient is anxious.        Historical Information   Past Medical History:   Diagnosis Date   • Depression 4/24/2023   • Malignant neoplasm of overlapping sites of both breasts in female, estrogen receptor positive ([address]) 4/24/2023   • Primary hypertension 4/24/2023     Past Surgical History:   Procedure Laterality Date   • MASTECTOMY Bilateral      Social History   Social History     Substance and Sexual Activity   Alcohol Use Never     Social History     Substance and Sexual Activity   Drug Use Not on file     Social History     Tobacco Use   Smoking Status Former   • Types: Cigarettes   Smokeless Tobacco Never   Tobacco Comments    quit smoking cigarettes in 2005 after smoking 1/3 pack of cigarette a day for 20 years     Family History:   Family History   Problem Relation Age of Onset   • Skin cancer Sister    • Lung cancer Maternal Grandfather    • Lung cancer Paternal Grandmother          Current Outpatient Medications:   •  cetirizine (ZyrTEC) 5 MG tablet, Take 5 mg by mouth daily, Disp: , Rfl:   •  gabapentin (NEURONTIN) 300 mg capsule, Take one capsule (300mg) in the morning and two capsules (600mg) at bedtime, Disp: 90 capsule, Rfl: 2  •  lisinopril (ZESTRIL) 20 mg tablet, Take 20 mg by mouth daily, Disp: , Rfl:   •  tamoxifen (NOLVADEX) 20 mg tablet, , Disp: , Rfl:   •  venlafaxine (EFFEXOR-XR) 75 mg 24 hr capsule, Take 1 capsule (75 mg total) by mouth daily, Disp: 30 capsule, Rfl: 2    Allergies   Allergen Reactions   • Latex Other (See Comments)     Per 4/24/23 Oncology note   • Penicillins Other (See Comments)     Per 4/24/23 Oncology note       Physical Exam:   Vitals:    07/24/23 1014   BP: 130/78   BP Location: Left arm   Patient Position: Sitting   Cuff Size: Large   Pulse: 78 Resp: 18   Temp: 98 °F (36.7 °C)   TempSrc: Temporal   SpO2: 99%   Weight: 87.1 kg (192 lb)   Height: 5' 7" (1.702 m)     Patient is alert and oriented. Patient is not in distress. Vital signs are above. Blood pressure is not high anymore. There is no icterus. There is no oral thrush. There is no palpable neck mass. Lung fields are clear to percussion and auscultation. Heart rate is regular. There is no palpable abdominal mass. Abdomen is soft and nontender. There is no ascites. There is no edema of ankles. There is no calf tenderness. There is no focal neurological deficit, no skin rash, no palpable lymphadenopathy in the neck and axillary areas,  no clubbing. Patient is anxious. Performance status 0. No lymphedema.       Lab Results: No reported other than Oncotype score was 15  LABS:    Results for orders placed or performed in visit on 07/19/23   CBC and differential   Result Value Ref Range    WBC 10.21 (H) 4.31 - 10.16 Thousand/uL    RBC 4.41 3.81 - 5.12 Million/uL    Hemoglobin 13.3 11.5 - 15.4 g/dL    Hematocrit 40.6 34.8 - 46.1 %    MCV 92 82 - 98 fL    MCH 30.2 26.8 - 34.3 pg    MCHC 32.8 31.4 - 37.4 g/dL    RDW 12.5 11.6 - 15.1 %    MPV 8.5 (L) 8.9 - 12.7 fL    Platelets 675 267 - 282 Thousands/uL    nRBC 0 /100 WBCs    Neutrophils Relative 62 43 - 75 %    Immat GRANS % 0 0 - 2 %    Lymphocytes Relative 26 14 - 44 %    Monocytes Relative 9 4 - 12 %    Eosinophils Relative 2 0 - 6 %    Basophils Relative 1 0 - 1 %    Neutrophils Absolute 6.50 1.85 - 7.62 Thousands/µL    Immature Grans Absolute 0.03 0.00 - 0.20 Thousand/uL    Lymphocytes Absolute 2.60 0.60 - 4.47 Thousands/µL    Monocytes Absolute 0.88 0.17 - 1.22 Thousand/µL    Eosinophils Absolute 0.15 0.00 - 0.61 Thousand/µL    Basophils Absolute 0.05 0.00 - 0.10 Thousands/µL   Comprehensive metabolic panel   Result Value Ref Range    Sodium 138 135 - 147 mmol/L    Potassium 4.0 3.5 - 5.3 mmol/L    Chloride 103 96 - 108 mmol/L    CO2 30 21 - 32 mmol/L    ANION GAP 5 mmol/L    BUN 13 5 - 25 mg/dL    Creatinine 0.64 0.60 - 1.30 mg/dL    Glucose 80 65 - 140 mg/dL    Calcium 9.3 8.4 - 10.2 mg/dL    AST 17 13 - 39 U/L    ALT 16 7 - 52 U/L    Alkaline Phosphatase 67 34 - 104 U/L    Total Protein 7.3 6.4 - 8.4 g/dL    Albumin 4.4 3.5 - 5.0 g/dL    Total Bilirubin 0.31 0.20 - 1.00 mg/dL    eGFR 102 ml/min/1.73sq m   Cancer antigen 27.29   Result Value Ref Range    CA 27-29 14.0 0.0 - 38.6 U/mL         Imaging Studies: No report  Pathology, and Other Studies: No reported other than Oncotype score was 15      Assessment and Plan:  See diagnoses, orders instructions below   Follow-up visit for bilateral breast cancers diagnosed in July 2020 and patient had bilateral mastectomies. She stated genetic testing was negative. She states she had invasive ductal carcinoma that was larger on the right side than left side. She states stage was 3 and lymph nodes were positive in right axilla but she does not know how many. After surgery she had radiation and she thinks that was given to the right side and she had that from October 2022- December 2020. Since that time she has been on tamoxifen 20 mg daily. She states she did not receive tamoxifen when she was getting radiation. Patient stopped having menstrual periods right after surgery even before she started tamoxifen. No menstrual periods in last 2 years. She could be switched to aromatase inhibitor but she states her doctor in [facility] [place] wanted her to take tamoxifen for 5 years and then letrozole for 5 years. Patient is advised to follow-up with a gynecologist because of tamoxifen and even otherwise. Since surgery she has some pain in the mastectomy scars and scars were revised twice. She states gabapentin is helping and also she takes a CBD. She gives history of depression and has been on Effexor ER 75. Has  history of hypertension and seasonal allergies. Menarche at 16.   She has 1 son and that was born when she was 30 and prior to that she had 2 miscarriages and had fertility treatments. She quit smoking cigarettes in 2005 after smoking 1/3 pack of cigarette a day for 20 years. No alcohol. Sister has history of skin cancer. Maternal grandfather and paternal grandmother had lung cancers and they smoked cigarettes. Patient is allergic to latex and penicillin. Physical examination as recorded and discussed. Our office has not gotten records from [facility] to my knowledge. She was referred to palliative care for symptom management of depression and pain and to family practice for general medical care. Ordered blood work. Discussed healthy diet and activities as tolerated and health screening test including GYN examination and colonoscopy. Patient is capable of self-care. Goal is cure from breast cancers. She states her genetic test was negative. I discussed in detail. Questions answered. 1. Malignant neoplasm of overlapping sites of both breasts in female, estrogen receptor positive ([address])    - CBC and differential; Future  - Comprehensive metabolic panel; Future  - Cancer antigen 27.29; Future  - XR chest pa & lateral; Future  - US abdomen complete; Future  - NM bone scan whole body; Future    2. Primary hypertension      3. Depression, unspecified depression type    - venlafaxine (EFFEXOR-XR) 75 mg 24 hr capsule; Take 1 capsule (75 mg total) by mouth daily  Dispense: 30 capsule; Refill: 2    4. Cancer related pain    - XR chest pa & lateral; Future  - US abdomen complete; Future  - NM bone scan whole body; Future    5. Insomnia, unspecified type    - venlafaxine (EFFEXOR-XR) 75 mg 24 hr capsule; Take 1 capsule (75 mg total) by mouth daily  Dispense: 30 capsule; Refill: 2    Blood work, chest x-ray, ultrasound abdomen and bone scan prior to next visit in 4 months. No change in tamoxifen. Prescribed Effexor XR 75 mg.   She already has this medicine from palliative care specialist that I did not realize. She knows this is the same medication and she will not duplicate. Patient will continue to follow with  primary physician and other consultants. Patient voiced understanding and agrees      Disclaimer: This document was prepared using a dictation device. If a word or phrase is confusing, or does not make sense, this is likely due to recognition error which was not discovered during the providers review. If you believe an error has occurred, please Contact me through [employer] service for ryley? cation. Counseling / Coordination of Care  . Renee Roth   Provided counseling and support

## 2023-07-26 ENCOUNTER — TELEPHONE (OUTPATIENT)
Dept: HEMATOLOGY ONCOLOGY | Facility: CLINIC | Age: 53
End: 2023-07-26

## 2023-07-26 NOTE — TELEPHONE ENCOUNTER
Returned telephone call spoke with Pt. She was asking about the [facility] rx that Dr Laura Fuentes was to send to local pharmacy. Explained he sent it to [facility]. She said that was ok that she just needs to call them and they will overnight it.

## 2023-08-02 ENCOUNTER — OFFICE VISIT (OUTPATIENT)
Dept: FAMILY MEDICINE CLINIC | Facility: CLINIC | Age: 53
End: 2023-08-02
Payer: COMMERCIAL

## 2023-08-02 ENCOUNTER — TELEPHONE (OUTPATIENT)
Dept: ADMINISTRATIVE | Facility: OTHER | Age: 53
End: 2023-08-02

## 2023-08-02 VITALS
HEART RATE: 76 BPM | WEIGHT: 196.8 LBS | OXYGEN SATURATION: 96 % | BODY MASS INDEX: 30.89 KG/M2 | SYSTOLIC BLOOD PRESSURE: 128 MMHG | DIASTOLIC BLOOD PRESSURE: 88 MMHG | RESPIRATION RATE: 20 BRPM | TEMPERATURE: 97.8 F | HEIGHT: 67 IN

## 2023-08-02 DIAGNOSIS — C50.811 MALIGNANT NEOPLASM OF OVERLAPPING SITES OF BOTH BREASTS IN FEMALE, ESTROGEN RECEPTOR POSITIVE (HCC): ICD-10-CM

## 2023-08-02 DIAGNOSIS — Z11.1 SCREENING-PULMONARY TB: ICD-10-CM

## 2023-08-02 DIAGNOSIS — F32.A DEPRESSION, UNSPECIFIED DEPRESSION TYPE: ICD-10-CM

## 2023-08-02 DIAGNOSIS — C50.812 MALIGNANT NEOPLASM OF OVERLAPPING SITES OF BOTH BREASTS IN FEMALE, ESTROGEN RECEPTOR POSITIVE (HCC): ICD-10-CM

## 2023-08-02 DIAGNOSIS — Z11.4 SCREENING FOR HIV (HUMAN IMMUNODEFICIENCY VIRUS): ICD-10-CM

## 2023-08-02 DIAGNOSIS — Z11.59 NEED FOR HEPATITIS C SCREENING TEST: ICD-10-CM

## 2023-08-02 DIAGNOSIS — Z12.11 SCREENING FOR MALIGNANT NEOPLASM OF COLON: ICD-10-CM

## 2023-08-02 DIAGNOSIS — I10 PRIMARY HYPERTENSION: Primary | ICD-10-CM

## 2023-08-02 DIAGNOSIS — Z17.0 MALIGNANT NEOPLASM OF OVERLAPPING SITES OF BOTH BREASTS IN FEMALE, ESTROGEN RECEPTOR POSITIVE (HCC): ICD-10-CM

## 2023-08-02 PROCEDURE — 99204 OFFICE O/P NEW MOD 45 MIN: CPT | Performed by: FAMILY MEDICINE

## 2023-08-02 RX ORDER — CETIRIZINE HYDROCHLORIDE 10 MG/1
10 TABLET ORAL DAILY
COMMUNITY

## 2023-08-02 NOTE — PATIENT INSTRUCTIONS
See if you've had prevnar 20 (pneumonia shot)  Continue current meds  Obtain fasting labs  Call if you need anything

## 2023-08-02 NOTE — PROGRESS NOTES
Name: Trav Khanna      : 1970      MRN: [id number]  Encounter Provider: Marcos Rojo DO  Encounter Date: 2023   Encounter department: [address]     1. Primary hypertension  Comments:  well-controlled on current meds  continue same  f/u 6 mo  Orders:  -     Lipid panel; Future  -     TSH, 3rd generation; Future    2. Depression, unspecified depression type  Comments:  controlled on effexor  continue same      3. Malignant neoplasm of overlapping sites of both breasts in female, estrogen receptor positive ([address])  Comments:  continue f/u with h/o and palliative care    4. Need for hepatitis C screening test  -     Hepatitis C Antibody; Future    5. Screening for HIV (human immunodeficiency virus)  -     HIV 1/2 AG/AB w Reflex [facility] for 2 yr old and above; Future    6. Screening-pulmonary TB  -     Quantiferon TB Gold Plus; Future    7. Screening for malignant neoplasm of colon  -     Cologuard      BMI Counseling: Body mass index is 30.82 kg/m². The BMI is above normal. Nutrition recommendations include encouraging healthy choices of fruits and vegetables. Exercise recommendations include exercising 3-5 times per week. Rationale for BMI follow-up plan is due to patient being overweight or obese. Subjective      HPI   Pt presents as new pt. Hx of breast CA. Seeing Dr. Rain Torrez. Chronic pain, seeing palliative care. She has moved from [place]. Was raised in [place]. Good support here. Will be volunteering with cancer patients. Due for crc screening, tb screening. Hiv/hep c screening. Pt has hx of hypertension. Well-controlled currently. Tolerating lisinopril. No cough, chest pain, shortness of breath, n/v, abd pain, visual changes, paresthesias, weakness. Depression is well-controlled on effexor. Feels well currently. Would like to continue same. No poor mood/motivation. No si/hi. No anxiety.           Review of Systems Constitutional: Negative for chills, fatigue, fever and unexpected weight change. HENT: Negative for congestion, ear pain, hearing loss, postnasal drip, rhinorrhea, sinus pressure, sinus pain, sore throat, trouble swallowing and voice change. Eyes: Negative for pain, redness and visual disturbance. Respiratory: Negative for cough and shortness of breath. Cardiovascular: Negative for chest pain, palpitations and leg swelling. Gastrointestinal: Negative for abdominal pain, constipation, diarrhea and nausea. Endocrine: Negative for cold intolerance, heat intolerance, polydipsia and polyuria. Genitourinary: Negative for dysuria, frequency and urgency. Musculoskeletal: Negative for arthralgias, joint swelling and myalgias. Skin: Negative for rash. No suspicious lesions   Neurological: Negative for weakness, numbness and headaches. Hematological: Negative for adenopathy. Current Outpatient Medications on File Prior to Visit   Medication Sig   • cetirizine (ZyrTEC) 10 mg tablet Take 10 mg by mouth daily   • gabapentin (NEURONTIN) 300 mg capsule Take one capsule (300mg) in the morning and two capsules (600mg) at bedtime   • lisinopril (ZESTRIL) 20 mg tablet Take 20 mg by mouth daily   • tamoxifen (NOLVADEX) 20 mg tablet Take 20 mg by mouth daily at bedtime   • venlafaxine (EFFEXOR-XR) 75 mg 24 hr capsule Take 1 capsule (75 mg total) by mouth daily   • cetirizine (ZyrTEC) 5 MG tablet Take 5 mg by mouth daily (Patient not taking: Reported on 8/2/2023)       Objective     /88   Pulse 76   Temp 97.8 °F (36.6 °C) (Temporal)   Resp 20   Ht 5' 7" (1.702 m)   Wt 89.3 kg (196 lb 12.8 oz)   LMP 06/13/2020   SpO2 96%   BMI 30.82 kg/m²     Physical Exam  Constitutional:       General: She is not in acute distress. Appearance: She is well-developed. HENT:      Head: Normocephalic and atraumatic.       Right Ear: Tympanic membrane, ear canal and external ear normal.      Left Ear: Tympanic membrane, ear canal and external ear normal.      Nose: Nose normal.      Mouth/Throat:      Pharynx: No oropharyngeal exudate. Eyes:      Conjunctiva/sclera: Conjunctivae normal.      Pupils: Pupils are equal, round, and reactive to light. Neck:      Thyroid: No thyromegaly. Vascular: No carotid bruit or JVD. Cardiovascular:      Rate and Rhythm: Regular rhythm. Heart sounds: S1 normal and S2 normal. No murmur heard. No friction rub. No gallop. No S3 or S4 sounds. Pulmonary:      Effort: Pulmonary effort is normal.      Breath sounds: Normal breath sounds. No wheezing, rhonchi or rales. Abdominal:      General: Bowel sounds are normal. There is no distension. Palpations: Abdomen is soft. Tenderness: There is no abdominal tenderness. Lymphadenopathy:      Cervical: No cervical adenopathy. Neurological:      Mental Status: She is alert and oriented to person, place, and time. Cranial Nerves: No cranial nerve deficit. Sensory: No sensory deficit. Deep Tendon Reflexes: Reflexes are normal and symmetric.        Prashanth Pugh, DO

## 2023-08-02 NOTE — LETTER
Vaccination Request Form: [id number] aka SARS-CoV-2 (Deneise Etta or [place] or [employer])      Date Requested: 23  Patient: Aneudy Ford  Patient : 1970   Referring Provider: [facility] primary care provider on file. The above patient has informed us that they have had their   most recent COVID-19 aka SARS-CoV-2 (Deneise Etta or [place] or [employer]) administered at your facility. Please   complete this form and attach all corresponding documentation. Date of Vaccine(s) Given  ______________________________    Lot Number(s) _______________________________________    Manufacture(s) ______________________________________    Dose Amount (s) _____________________________________    Expiration Date(s) ____________________________________    Comments __________________________________________________________  ____________________________________________________________________  ____________________________________________________________________  ____________________________________________________________________    Administering Facility  ________________________________________________    Vaccine Administered By (print name) ___________________________________      Form Completed By (print name) _______________________________________      Signature ___________________________________________________________      These reports are needed for  compliance. Please fax this completed form and a copy of the Vaccine Document(s) to our office located at [address] [place] [address] as soon as possible to Fax [phone] alex Cardozo: Phone [phone]    We thank you for your assistance in treating our mutual patient.

## 2023-08-02 NOTE — TELEPHONE ENCOUNTER
----- Message from [facility] sent at 8/2/2023 10:14 AM EDT -----  08/02/23 10:14 AM    Hello, our patient Meek Hinojosa has had the COVID vaccine completed/performed. Please assist in updating the patient chart by making an External outreach to [address] [place] [address] located in [place], [place].     Thank you,  Elba Murdock  PG  Joao Cue

## 2023-08-02 NOTE — LETTER
Vaccination Request Form: [id number] aka SARS-CoV-2 (Jaclyn Lora or [place] or [employer])      Date Requested: 23  Patient: Richelle Care  Patient : 1970   Referring Provider: [facility] care provider on file. The above patient has informed us that they have had their   most recent COVID-19 aka SARS-CoV-2 (Jaclyn Lora or [place] or [employer]) administered at your facility. Please   complete this form and attach all corresponding documentation. Date of Vaccine(s) Given  ______________________________    Lot Number(s) _______________________________________    Manufacture(s) ______________________________________    Dose Amount (s) _____________________________________    Expiration Date(s) ____________________________________    Comments __________________________________________________________  ____________________________________________________________________  ____________________________________________________________________  ____________________________________________________________________    Administering Facility  ________________________________________________    Vaccine Administered By (print name) ___________________________________      Form Completed By (print name) _______________________________________      Signature ___________________________________________________________      These reports are needed for  compliance. Please fax this completed form and a copy of the Vaccine Document(s) to our office located at [address] [place] [address] as soon as possible to Fax [phone] alex Lezama Boaz: Phone [phone]    We thank you for your assistance in treating our mutual patient.

## 2023-08-03 NOTE — TELEPHONE ENCOUNTER
Upon review of the In Basket request and the patient's chart, initial outreach has been made via fax to facility. Please see Contacts section for details.      Thank you  Trinity Chaudhari MA

## 2023-08-14 NOTE — TELEPHONE ENCOUNTER
As a follow-up, a second attempt has been made for outreach via fax to facility. Please see Contacts section for details.     Thank you  Sara Mcpherson MA

## 2023-08-16 NOTE — TELEPHONE ENCOUNTER
Upon review of the In Basket request we were able to locate, review, and update the patient chart as requested for Immunization(s) covid . Any additional questions or concerns should be emailed to the Practice Liaisons via the appropriate education email address, please do not reply via In Basket.     Thank you  Dedrick Rivera MA

## 2023-08-23 LAB — COLOGUARD RESULT REPORTABLE: NEGATIVE

## 2023-09-05 ENCOUNTER — TELEMEDICINE (OUTPATIENT)
Dept: FAMILY MEDICINE CLINIC | Facility: CLINIC | Age: 53
End: 2023-09-05
Payer: COMMERCIAL

## 2023-09-05 VITALS — BODY MASS INDEX: 31.08 KG/M2 | WEIGHT: 198 LBS | HEIGHT: 67 IN

## 2023-09-05 DIAGNOSIS — U07.1 COVID-19: Primary | ICD-10-CM

## 2023-09-05 PROCEDURE — 99213 OFFICE O/P EST LOW 20 MIN: CPT | Performed by: FAMILY MEDICINE

## 2023-09-05 RX ORDER — NIRMATRELVIR AND RITONAVIR 300-100 MG
3 KIT ORAL 2 TIMES DAILY
Qty: 30 TABLET | Refills: 0 | Status: SHIPPED | OUTPATIENT
Start: 2023-09-05 | End: 2023-09-10

## 2023-09-05 NOTE — PROGRESS NOTES
COVID-19 Outpatient Progress Note    Assessment/Plan:    Problem List Items Addressed This Visit    None  Visit Diagnoses     COVID-19    -  Primary    Relevant Medications    nirmatrelvir & ritonavir (Paxlovid, 300/100,) tablet therapy pack         Disposition:     Discussed symptom directed medication options with patient. Discussed paxlovid. Will start same  Isolation through 9/6; may break isolation after 9/6 if afebrile x 24 hours without antipyretics and rest of sx improving. Mask 5 days after isolation  Call if sx worsen    Patient meets criteria for PAXLOVID and they have been counseled appropriately according to EUA documentation released by the FDA. After discussion, patient agrees to treatment. Adriel Rather is an investigational medicine used to treat mild-to-moderate COVID-19 in adults and children (15years of age and older weighing at least 80 pounds (40 kg)) with positive results of direct SARS-CoV-2 viral testing, and who are at high risk for progression to severe COVID-19, including hospitalization or death. PAXLOVID is investigational because it is still being studied. There is limited information about the safety and effectiveness of using PAXLOVID to treat people with mild-to-moderate COVID-19. The FDA has authorized the emergency use of PAXLOVID for the treatment of mild-tomoderate COVID-19 in adults and children (15years of age and older weighing at least 80 pounds (40 kg)) with a positive test for the virus that causes COVID-19, and who are at high risk for progression to severe COVID-19, including hospitalization or death, under an EUA. What should I tell my healthcare provider before I take PAXLOVID?     Tell your healthcare provider if you:  - Have any allergies  - Have liver or kidney disease  - Are pregnant or plan to become pregnant  - Are breastfeeding a child  - Have any serious illnesses    Tell your healthcare provider about all the medicines you take, including prescription and over-the-counter medicines, vitamins, and herbal supplements. Some medicines may interact with PAXLOVID and may cause serious side effects. Keep a list of your medicines to show your healthcare provider and pharmacist when you get a new medicine. You can ask your healthcare provider or pharmacist for a list of medicines that interact with PAXLOVID. Do not start taking a new medicine without telling your healthcare provider. Your healthcare provider can tell you if it is safe to take PAXLOVID with other medicines. Tell your healthcare provider if you are taking combined hormonal contraceptive. PAXLOVID may affect how your birth control pills work. Females who are able to become pregnant should use another effective alternative form of contraception or an additional barrier method of contraception. Talk to your healthcare provider if you have any questions about contraceptive methods that might be right for you. How do I take PAXLOVID? PAXLOVID consists of 2 medicines: nirmatrelvir and ritonavir. - Take 2 pink tablets of nirmatrelvir with 1 white tablet of ritonavir by mouth 2 times each day (in the morning and in the evening) for 5 days. For each dose, take all 3 tablets at the same time. - If you have kidney disease, talk to your healthcare provider. You may need a different dose. - Swallow the tablets whole. Do not chew, break, or crush the tablets  - Take PAXLOVID with or without food. - Do not stop taking PAXLOVID without talking to your healthcare provider, even if you feel better. - If you miss a dose of PAXLOVID within 8 hours of the time it is usually taken, take it as soon as you remember. If you miss a dose by more than 8 hours, skip the missed dose and take the next dose at your regular time. Do not take 2 doses of PAXLOVID at the same time. - If you take too much PAXLOVID, call your healthcare provider or go to the nearest hospital emergency room right away.   - If you are taking a ritonavir- or cobicistat-containing medicine to treat hepatitis C or Human Immunodeficiency Virus (HIV), you should continue to take your medicine as prescribed by your healthcare provider.  - Talk to your healthcare provider if you do not feel better or if you feel worse after 5 days. Who should generally not take PAXLOVID? Do not take PAXLOVID if:  You are allergic to nirmatrelvir, ritonavir, or any of the ingredients in PAXLOVID. You are taking any of the following medicines:  - Alfuzosin  - Pethidine, piroxicam, propoxyphene  - Ranolazine  - Amiodarone, dronedarone, flecainide, propafenone, quinidine  - Colchicine  - Lurasidone, pimozide, clozapine  - Dihydroergotamine, ergotamine, methylergonovine  - Lovastatin, simvastatin  - Sildenafil (Revatio®) for pulmonary arterial hypertension (PAH)  - Triazolam, oral midazolam  - Apalutamide  - Carbamazepine, phenobarbital, phenytoin  - Rifampin  - Valerie’s Wort (hypericum perforatum)    What are the important possible side effects of PAXLOVID? Possible side effects of PAXLOVID are:  - Liver Problems. Tell your healthcare provider right away if you have any of these signs and symptoms of liver problems: loss of appetite, yellowing of your skin and the whites of eyes (jaundice), dark-colored urine, pale colored stools and itchy skin, stomach area (abdominal) pain. - Resistance to HIV Medicines. If you have untreated HIV infection, PAXLOVID may lead to some HIV medicines not working as well in the future. - Other possible side effects include: altered sense of taste, diarrhea, high blood pressure, or muscle aches    These are not all the possible side effects of PAXLOVID. Not many people have taken PAXLOVID. Serious and unexpected side effects may happen. [place] is still being studied, so it is possible that all of the risks are not known at this time. What other treatment choices are there?   Like Yovani Adjutant may allow for the emergency use of other medicines to treat people with COVID-19. Go to https://ROOOMERS/ for information on the emergency use of other medicines that are authorized by FDA to treat people with COVID-19. Your healthcare provider may talk with you about clinical trials for which you may be eligible. It is your choice to be treated or not to be treated with PAXLOVID. Should you decide not to receive it or for your child not to receive it, it will not change your standard medical care. What if I am pregnant or breastfeeding? There is no experience treating pregnant women or breastfeeding mothers with PAXLOVID. For a mother and unborn baby, the benefit of taking PAXLOVID may be greater than the risk from the treatment. If you are pregnant, discuss your options and specific situation with your healthcare provider. It is recommended that you use effective barrier contraception or do not have sexual activity while taking PAXLOVID. If you are breastfeeding, discuss your options and specific situation with your healthcare provider. How do I report side effects with PAXLOVID? Contact your healthcare provider if you have any side effects that bother you or do not go away. Report side effects to FDA MedWatch at www.fda.gov/medwatch or call 6-721-QLO7063 or you can report side effects to [facility]. at the contact information provided below. Website Fax number Telephone number   Teach Me To Be [phone]     How should I store [address] [place] [address]? Store PAXLOVID tablets at room temperature between 68°F to 77°F (20°C to 25°C).     Full fact sheet for patients, parents, and caregivers can be found at: theeventwall.co.za    I have spent a total time of 5 minutes on the day of the encounter for this patient including instructions for management and documenting in the medical record. Encounter provider: Nina Thomas DO     Provider located at: [address] [address] [phone]     Recent Visits  No visits were found meeting these conditions. Showing recent visits within past 7 days and meeting all other requirements  Today's Visits  Date Type Provider Dept   09/05/23 Telemedicine Nina [address] today's visits and meeting all other requirements  Future Appointments  No visits were found meeting these conditions. Showing future appointments within next 150 days and meeting all other requirements     This virtual check-in was done via [address] [place] [address] and patient was informed that this is a secure, HIPAA-compliant platform. She agrees to proceed. Patient agrees to participate in a virtual check in via telephone or video visit instead of presenting to the office to address urgent/immediate medical needs. Patient is aware this is a billable service. She acknowledged consent and understanding of privacy and security of the video platform. The patient has agreed to participate and understands they can discontinue the visit at any time. After connecting through [facility], the patient was identified by name and date of birth. Nelia Julian was informed that this was a telemedicine visit and that the exam was being conducted confidentially over secure lines. My office door was closed. No one else was in the room. Nelia Julian acknowledged consent and understanding of privacy and security of the telemedicine visit. I informed the patient that I have reviewed her record in Epic and presented the opportunity for her to ask any questions regarding the visit today. The patient agreed to participate.     Verification of patient location:  Patient is located in the following state in which I hold an active license: [place]    Subjective:   Nelia Julian is a 47 y.o. female who is concerned about COVID-19. Patient's symptoms include fever, chills, fatigue, malaise, nasal congestion, rhinorrhea, sore throat, cough, chest tightness, diarrhea, myalgias and headache. Patient denies anosmia, loss of taste, shortness of breath, abdominal pain, nausea and vomiting.     - Date of symptom onset: 9/1/2023      COVID-19 vaccination status: Fully vaccinated with booster    Exposure:   Contact with a person who is under investigation (PUI) for or who is positive for COVID-19 within the last 14 days?: Yes    Hospitalized recently for fever and/or lower respiratory symptoms?: No      Currently a healthcare worker that is involved in direct patient care?: No      Works in a special setting where the risk of COVID-19 transmission may be high? (this may include long-term care, correctional and longterm facilities; homeless shelters; assisted-living facilities and group homes.): No      Resident in a special setting where the risk of COVID-19 transmission may be high? (this may include long-term care, correctional and longterm facilities; homeless shelters; assisted-living facilities and group homes.): No      Sx as above. No shortness of breath. Hx CA and radiation tx.  also +    No results found for: "Thedo Sames", "[address] [facility]", "[address] [place] [address],[address]", "[facility]", "[facility]", "[facility]"    Review of Systems   Constitutional: Positive for chills, fatigue and fever. HENT: Positive for congestion, rhinorrhea and sore throat. Respiratory: Positive for cough and chest tightness. Negative for shortness of breath. Gastrointestinal: Positive for diarrhea. Negative for abdominal pain, nausea and vomiting. Musculoskeletal: Positive for myalgias. Neurological: Positive for headaches.      Current Outpatient Medications on File Prior to Visit   Medication Sig   • cetirizine (ZyrTEC) 10 mg tablet Take 10 mg by mouth daily   • gabapentin (NEURONTIN) 300 mg capsule Take one capsule (300mg) in the morning and two capsules (600mg) at bedtime   • lisinopril (ZESTRIL) 20 mg tablet Take 20 mg by mouth daily   • tamoxifen (NOLVADEX) 20 mg tablet Take 20 mg by mouth daily at bedtime   • venlafaxine (EFFEXOR-XR) 75 mg 24 hr capsule Take 1 capsule (75 mg total) by mouth daily   • cetirizine (ZyrTEC) 5 MG tablet Take 5 mg by mouth daily (Patient not taking: Reported on 8/2/2023)       Objective:    Ht 5' 7" (1.702 m) Comment: verbal per pt  Wt 89.8 kg (198 lb) Comment: verbal per pt  BMI 31.01 kg/m²        Physical Exam  Constitutional:       Appearance: Normal appearance. HENT:      Head: Normocephalic and atraumatic. Eyes:      Extraocular Movements: Extraocular movements intact. Conjunctiva/sclera: Conjunctivae normal.   Pulmonary:      Effort: Pulmonary effort is normal. No respiratory distress. Neurological:      Mental Status: She is alert and oriented to person, place, and time. Cranial Nerves: No cranial nerve deficit.        Nina Thomas, DO

## 2023-09-18 ENCOUNTER — TELEPHONE (OUTPATIENT)
Age: 53
End: 2023-09-18

## 2023-09-18 NOTE — TELEPHONE ENCOUNTER
Vijay Grissom from [address] [place] [address] called stating that patient needs an insurance referral. NPI- [id number] Diagnosis codes: C50.811, C50.812, Z17.10, G89.3. They are asking this to be faxed to [phone]. (4) History of Falls or Infant-Toddler Placed in Bed

## 2023-09-25 ENCOUNTER — OFFICE VISIT (OUTPATIENT)
Dept: PALLIATIVE MEDICINE | Facility: CLINIC | Age: 53
End: 2023-09-25
Payer: COMMERCIAL

## 2023-09-25 VITALS
SYSTOLIC BLOOD PRESSURE: 128 MMHG | HEIGHT: 67 IN | DIASTOLIC BLOOD PRESSURE: 82 MMHG | BODY MASS INDEX: 31.08 KG/M2 | OXYGEN SATURATION: 97 % | TEMPERATURE: 98.9 F | HEART RATE: 85 BPM | WEIGHT: 198 LBS

## 2023-09-25 DIAGNOSIS — R63.0 LOSS OF APPETITE: ICD-10-CM

## 2023-09-25 DIAGNOSIS — C50.811 MALIGNANT NEOPLASM OF OVERLAPPING SITES OF BOTH BREASTS IN FEMALE, ESTROGEN RECEPTOR POSITIVE: Primary | ICD-10-CM

## 2023-09-25 DIAGNOSIS — F32.A DEPRESSION: ICD-10-CM

## 2023-09-25 DIAGNOSIS — F41.9 ANXIOUSNESS: ICD-10-CM

## 2023-09-25 DIAGNOSIS — C50.812 MALIGNANT NEOPLASM OF OVERLAPPING SITES OF BOTH BREASTS IN FEMALE, ESTROGEN RECEPTOR POSITIVE: Primary | ICD-10-CM

## 2023-09-25 DIAGNOSIS — G89.28 CHRONIC POST-OPERATIVE PAIN: ICD-10-CM

## 2023-09-25 DIAGNOSIS — Z51.5 PALLIATIVE CARE PATIENT: ICD-10-CM

## 2023-09-25 DIAGNOSIS — R11.0 NAUSEA: ICD-10-CM

## 2023-09-25 DIAGNOSIS — Z17.0 MALIGNANT NEOPLASM OF OVERLAPPING SITES OF BOTH BREASTS IN FEMALE, ESTROGEN RECEPTOR POSITIVE: Primary | ICD-10-CM

## 2023-09-25 DIAGNOSIS — G89.3 CANCER RELATED PAIN: ICD-10-CM

## 2023-09-25 DIAGNOSIS — G47.00 INSOMNIA: ICD-10-CM

## 2023-09-25 DIAGNOSIS — Z79.899 MEDICAL MARIJUANA USE: ICD-10-CM

## 2023-09-25 PROCEDURE — 99214 OFFICE O/P EST MOD 30 MIN: CPT | Performed by: INTERNAL MEDICINE

## 2023-09-25 RX ORDER — VENLAFAXINE HYDROCHLORIDE 75 MG/1
75 CAPSULE, EXTENDED RELEASE ORAL EVERY EVENING
Qty: 30 CAPSULE | Refills: 5 | Status: SHIPPED | OUTPATIENT
Start: 2023-09-25

## 2023-09-25 RX ORDER — GABAPENTIN 300 MG/1
CAPSULE ORAL
Qty: 90 CAPSULE | Refills: 2 | Status: SHIPPED | OUTPATIENT
Start: 2023-09-25

## 2023-09-25 RX ORDER — ONDANSETRON 4 MG/1
4 TABLET, FILM COATED ORAL EVERY 8 HOURS PRN
Qty: 20 TABLET | Refills: 2 | Status: SHIPPED | OUTPATIENT
Start: 2023-09-25

## 2023-09-25 NOTE — PATIENT INSTRUCTIONS
It was good to see you today. Thank you for coming in. Please bring any completed advanced directives (Living Will and/or healthcare Power of  documents, etc) in to any [facility] facility; staff can scan it into your chart. Resume gabapentin at 300mg in the morning, 600mg in the evening. Call me for refills or if changes are needed. Kaylah Colorado and Dr Acosta's have some topical creams that can help with neuropathic or other pain, or you can make your. Return in about 3 months (around 12/25/2023). Call us for refills on medications that we supply, as needed. If something changes and you need to come in sooner, please call our office. PRESCRIPTION REFILL REMINDER:  All medication refills should be requested prior to [facility] on Friday. Any refill requests after noon on Friday would be addressed the following Monday. MEDICATION SAFETY ISSUES:  Do not drive under the influence of narcotics (including opioids), watch for adverse effects including confusion / altered mental status / respiratory depression (slowed breathing), keep medications stored in a safe/locked environment, do not use alcohol while opioids or other narcotics are in your system.

## 2023-09-25 NOTE — PROGRESS NOTES
Follow-up with Palliative and Supportive Care  Bird Heredia 47 y.o. female [id number]    ASSESSMENT & PLAN:  1. Malignant neoplasm of overlapping sites of both breasts in female, estrogen receptor positive ([address])    2. Depression    3. Anxiousness    4. Insomnia    5. Loss of appetite    6. Cancer related pain    7. Chronic post-operative pain    8. Medical marijuana use    9. Palliative care patient    10. Nausea          • Patient using [id number] for most medications. • Continue disease-directed cares. • ACP: Patient has completed advanced directives (Living Will and Power of ) naming her son as default surrogate healthcare decision-maker(s). Advanced directive counseling given today. Asked today for patient to please bring documentation in to any [facility] visit be scanned into the EMR. • Patient has established w/ a new local PCP; appreciate Dr Devendra Coleman helping this patient through recent [facility] infection. She has lingering fatigue. • Continue gabapentin at 300mg qAM, 600mg QHS; effective for patient's chronic pain. She prefers to avoid opioids. • Recommend patient consider topical OTC products, local application of heat or cold, Tylenol up to 1000mg TID for chronic pain. Do not use heat on top of topical agents. Do not mix topical agents. • Continue venlafaxine for depression, anxiousness. Effective. • Patient reports MMJ has been mostly helpful for anxiousness and insomnia, to a lesser extent w/ pain. She is purchasing product at the dispensary and using it to make edibles; she is quite knowledgeable re: MMJ. Counseled on topical agents for pain, today. • Start Zofran PRN N/V.  • Reviewed notes (Medical Oncology, PCP), labs (8/14/23 Cologuard, 7/19/23 TB/IFgamma, CR 0.64, alb 4.4, CA 27.29 14.0, Hb 13.3), imaging + procedures (6/25/20 MRI bilateral breast). Return in about 3 months (around 12/25/2023). • Emotional support provided.   • Medication safety issues addressed - no driving under the influence of narcotics (including opioids), watch for adverse effects including AMS or respiratory depression (slowed breathing), keep medications stored in a safe/locked environment, do not use alcohol while opioids or other narcotics are in one's system. Requested Prescriptions     Signed Prescriptions Disp Refills   • gabapentin (NEURONTIN) 300 mg capsule 90 capsule 2     Sig: Take one capsule (300mg) in the morning and two capsules (600mg) at bedtime   • venlafaxine (EFFEXOR-XR) 75 mg 24 hr capsule 30 capsule 5     Sig: Take 1 capsule (75 mg total) by mouth every evening   • ondansetron (ZOFRAN) 4 mg tablet 20 tablet 2     Sig: Take 1 tablet (4 mg total) by mouth every 8 (eight) hours as needed for nausea or vomiting       Medications Discontinued During This Encounter   Medication Reason   • cetirizine (ZyrTEC) 5 MG tablet Duplicate order   • gabapentin (NEURONTIN) 300 mg capsule Reorder   • venlafaxine (EFFEXOR-XR) 75 mg 24 hr capsule Reorder       Representatives have queried the patient's controlled substance dispensing history in the Prescription Drug Monitoring Program in compliance with regulations before I have prescribed any controlled substances. The prescription history is consistent with prescribed therapy and our practice policies. 30 minutes were spent in this ambulatory visit with greater than 50% of the time spent face to face with patient in counseling or coordination of care including symptom assessment and management, medication review, psychosocial support, chart review, imaging review, lab review, advanced directives, medical marijuana, supportive listening and anticipatory guidance. All of the patient's questions were answered during this discussion.     SUBJECTIVE:  Chief Complaint   Patient presents with   • Follow-up   • Medication Refill   • Cancer   • Pain     with neuropathic component   • Post-op Problem   • Anxiety   • Depression   • Insomnia   • Counseling   • COVID-19   • Nausea   • Fatigue        HPI    Radhames Daniels is a 47 y.o. female w/ bilateral breast cancer (diagnosed 07/2020 as invasive ductal carcinoma) s/p bilateral mastectomies + RT, on tamoxifen; depression, HTN, seasonal allergies. She follows w/ Dr Larry Tavarez (Medical Oncology). She recently transferred care from [facility] to the [facility] (moved to [place] in 10/2022). Certified for [facility] in the [facility] 6/28/23 for assistance with “chronic pain, loss of appetite, anxiousness, insomnia, depressed mood”. Patient is known to [facility] clinic; seen in consult 6/28/23 for symptom assessment and management, medication review, psychosocial support, chart review, imaging review, lab review, advanced directives, goals of care, medical marijuana, supportive listening and anticipatory guidance. Recent COVID19 infection, s/p Paxlovid therapy. Patient reports that she had high fevers and other symptoms associated with her COVID-19 infection. Paxlovidt was helpful for her, and she was able to recover somewhat. She has some lingering fatigue/weakness, though she is weeks out from therapy. She is appreciative of the support provided by her new primary care provider. Patient reports the gabapentin at 300 mg in the morning and 600 mg in the evening is quite helpful for her chronic postoperative and cancer related pain. This pain has a neuropathic component. She had tried some topical medical marijuana products for pain, but they have not been very effective to date. Patient is using oral medical marijuana products with good results, though. She is making her own baked goods with dispensary-purchased MJ products. These mostly help with anxiousness, and insomnia. Patient endorses "a little" nausea intermittently, mostly when she has an "empty stomach". She is using dry foods such as crackers or pretzels to help with this. She is amenable to trying an antiemetic.     PDMP shows no concerns. The following portions of the medical history were reviewed: past medical history, surgical history, problem list, medication list, family history, and social history. Current Outpatient Medications:   •  cetirizine (ZyrTEC) 10 mg tablet, Take 10 mg by mouth daily, Disp: , Rfl:   •  gabapentin (NEURONTIN) 300 mg capsule, Take one capsule (300mg) in the morning and two capsules (600mg) at bedtime, Disp: 90 capsule, Rfl: 2  •  lisinopril (ZESTRIL) 20 mg tablet, Take 20 mg by mouth daily, Disp: , Rfl:   •  ondansetron (ZOFRAN) 4 mg tablet, Take 1 tablet (4 mg total) by mouth every 8 (eight) hours as needed for nausea or vomiting, Disp: 20 tablet, Rfl: 2  •  tamoxifen (NOLVADEX) 20 mg tablet, Take 20 mg by mouth daily at bedtime, Disp: , Rfl:   •  venlafaxine (EFFEXOR-XR) 75 mg 24 hr capsule, Take 1 capsule (75 mg total) by mouth every evening, Disp: 30 capsule, Rfl: 5    Review of Systems   Gastrointestinal: Positive for nausea ("a little", mostly on empty stomach). Musculoskeletal: Positive for myalgias. Neurological:        Neuropathy / nerve pain in b/l lateral chest walls / axillae. Psychiatric/Behavioral: Positive for dysphoric mood (managed / at goal) and sleep disturbance (improves w/ MMJ). The patient is nervous/anxious (improves w/ MMJ). All other systems reviewed and are negative. OBJECTIVE:  /82 (BP Location: Left arm, Patient Position: Sitting, Cuff Size: Standard)   Pulse 85   Temp 98.9 °F (37.2 °C) (Temporal)   Ht 5' 7" (1.702 m)   Wt 89.8 kg (198 lb)   SpO2 97%   BMI 31.01 kg/m²   Physical Exam  Vitals reviewed. Constitutional:       General: She is not in acute distress. Appearance: She is well-developed, well-groomed and overweight. She is not toxic-appearing. HENT:      Head: Normocephalic and atraumatic. Right Ear: External ear normal.      Left Ear: External ear normal.   Eyes:      General: No scleral icterus. Right eye: No discharge. Left eye: No discharge. Extraocular Movements: Extraocular movements intact. Conjunctiva/sclera: Conjunctivae normal.      Pupils: Pupils are equal, round, and reactive to light. Cardiovascular:      Rate and Rhythm: Normal rate. Pulmonary:      Effort: Pulmonary effort is normal. No tachypnea, bradypnea, accessory muscle usage or respiratory distress. Comments: Able to speak comfortably in complete sentences on room air at rest.  Abdominal:      General: There is no distension. Tenderness: There is no guarding. Musculoskeletal:      Cervical back: Normal range of motion. Right lower leg: No edema. Left lower leg: No edema. Skin:     General: Skin is dry. Coloration: Skin is not pale. Neurological:      Mental Status: She is alert and oriented to person, place, and time. Cranial Nerves: No dysarthria or facial asymmetry. Gait: Gait normal.      Comments: Using no assistive devices for ambulation. Psychiatric:         Attention and Perception: Attention normal.         Mood and Affect: Mood and affect normal.         Speech: Speech normal.         Behavior: Behavior normal. Behavior is cooperative. Thought Content: Thought content normal.         Cognition and Memory: Cognition and memory normal.         Judgment: Judgment normal.          Suzy Shrestha MD  [facility]  [phone]    Portions of this document may have been created using dictation software and as such some "sound alike" terms may have been generated by the system. Do not hesitate to contact me with any questions or clarifications.

## 2023-10-24 ENCOUNTER — HOSPITAL ENCOUNTER (OUTPATIENT)
Dept: ULTRASOUND IMAGING | Facility: HOSPITAL | Age: 53
Discharge: HOME | End: 2023-10-24
Attending: INTERNAL MEDICINE
Payer: COMMERCIAL

## 2023-10-24 ENCOUNTER — HOSPITAL ENCOUNTER (OUTPATIENT)
Dept: NUCLEAR MEDICINE | Facility: HOSPITAL | Age: 53
Discharge: HOME | End: 2023-10-24
Attending: INTERNAL MEDICINE
Payer: COMMERCIAL

## 2023-10-24 DIAGNOSIS — C50.811 MALIGNANT NEOPLASM OF OVERLAPPING SITES OF BOTH BREASTS IN FEMALE, ESTROGEN RECEPTOR POSITIVE: Primary | ICD-10-CM

## 2023-10-24 DIAGNOSIS — Z17.0 MALIGNANT NEOPLASM OF OVERLAPPING SITES OF BOTH BREASTS IN FEMALE, ESTROGEN RECEPTOR POSITIVE: ICD-10-CM

## 2023-10-24 DIAGNOSIS — C50.812 MALIGNANT NEOPLASM OF OVERLAPPING SITES OF BOTH BREASTS IN FEMALE, ESTROGEN RECEPTOR POSITIVE: Primary | ICD-10-CM

## 2023-10-24 DIAGNOSIS — G89.3 CANCER RELATED PAIN: ICD-10-CM

## 2023-10-24 DIAGNOSIS — C50.812 MALIGNANT NEOPLASM OF OVERLAPPING SITES OF BOTH BREASTS IN FEMALE, ESTROGEN RECEPTOR POSITIVE: ICD-10-CM

## 2023-10-24 DIAGNOSIS — Z17.0 MALIGNANT NEOPLASM OF OVERLAPPING SITES OF BOTH BREASTS IN FEMALE, ESTROGEN RECEPTOR POSITIVE: Primary | ICD-10-CM

## 2023-10-24 DIAGNOSIS — C50.811 MALIGNANT NEOPLASM OF OVERLAPPING SITES OF BOTH BREASTS IN FEMALE, ESTROGEN RECEPTOR POSITIVE: ICD-10-CM

## 2023-10-24 DIAGNOSIS — C79.51 METASTASIS TO BONE (HCC): ICD-10-CM

## 2023-10-24 PROCEDURE — A9503 TC99M MEDRONATE: HCPCS

## 2023-10-24 PROCEDURE — 76700 US EXAM ABDOM COMPLETE: CPT

## 2023-10-24 PROCEDURE — G1004 CDSM NDSC: HCPCS

## 2023-10-24 PROCEDURE — 78306 BONE IMAGING WHOLE BODY: CPT

## 2023-10-25 ENCOUNTER — TELEPHONE (OUTPATIENT)
Dept: HEMATOLOGY ONCOLOGY | Facility: CLINIC | Age: 53
End: 2023-10-25

## 2023-10-25 NOTE — TELEPHONE ENCOUNTER
Call Transfer   Who are you speaking with? Patient   If it is not the patient, are they listed on an active communication consent form? N/A   Who is the patients HemOnc/SurgOnc provider? Dr. Vivian Reyes   What is the reason for this call? Patient requesting central scheduling    Person/Department that the call was transferred to? Time that call was transferred? central scheduling  @9:30AM    Your call will be transferred now. If you receive a voicemail, please leave a detailed message and a member of the team will return your call as soon as possible. Did you relay this information to the caller?   Yes

## 2023-10-31 DIAGNOSIS — I10 PRIMARY HYPERTENSION: Primary | ICD-10-CM

## 2023-10-31 RX ORDER — LISINOPRIL 20 MG/1
20 TABLET ORAL DAILY
Qty: 90 TABLET | Refills: 1 | Status: SHIPPED | OUTPATIENT
Start: 2023-10-31

## 2023-10-31 NOTE — TELEPHONE ENCOUNTER
Reason for call:   [x] Refill   [] Prior Auth  [] Other:     Office:   [x] PCP/Provider - [address] [place] [address]  [] Specialty/Provider -     Medication: lisinopril (ZESTRIL) 20 mg tablet     Quantity: 90    Pharmacy:   [address] [place] [address] [address], [address] [address]  Phone: [phone]  Fax: [phone]       Does the patient have enough for 3 days?    [x] Yes   [] No - Send as HP to POD

## 2023-11-02 ENCOUNTER — HOSPITAL ENCOUNTER (OUTPATIENT)
Dept: MRI IMAGING | Facility: HOSPITAL | Age: 53
End: 2023-11-02
Attending: INTERNAL MEDICINE
Payer: COMMERCIAL

## 2023-11-02 DIAGNOSIS — C50.811 MALIGNANT NEOPLASM OF OVERLAPPING SITES OF BOTH BREASTS IN FEMALE, ESTROGEN RECEPTOR POSITIVE: ICD-10-CM

## 2023-11-02 DIAGNOSIS — Z17.0 MALIGNANT NEOPLASM OF OVERLAPPING SITES OF BOTH BREASTS IN FEMALE, ESTROGEN RECEPTOR POSITIVE: ICD-10-CM

## 2023-11-02 DIAGNOSIS — C50.812 MALIGNANT NEOPLASM OF OVERLAPPING SITES OF BOTH BREASTS IN FEMALE, ESTROGEN RECEPTOR POSITIVE: ICD-10-CM

## 2023-11-02 DIAGNOSIS — C79.51 METASTASIS TO BONE (HCC): ICD-10-CM

## 2023-11-02 PROCEDURE — 73220 MRI UPPR EXTREMITY W/O&W/DYE: CPT

## 2023-11-02 PROCEDURE — G1004 CDSM NDSC: HCPCS

## 2023-11-02 PROCEDURE — 70553 MRI BRAIN STEM W/O & W/DYE: CPT

## 2023-11-02 PROCEDURE — A9585 GADOBUTROL INJECTION: HCPCS | Performed by: INTERNAL MEDICINE

## 2023-11-02 RX ORDER — GADOBUTROL 604.72 MG/ML
9 INJECTION INTRAVENOUS
Status: COMPLETED | OUTPATIENT
Start: 2023-11-02 | End: 2023-11-02

## 2023-11-02 RX ADMIN — GADOBUTROL 9 ML: 604.72 INJECTION INTRAVENOUS at 10:41

## 2023-11-08 ENCOUNTER — TELEPHONE (OUTPATIENT)
Dept: HEMATOLOGY ONCOLOGY | Facility: MEDICAL CENTER | Age: 53
End: 2023-11-08

## 2023-11-13 ENCOUNTER — TELEPHONE (OUTPATIENT)
Dept: HEMATOLOGY ONCOLOGY | Facility: CLINIC | Age: 53
End: 2023-11-13

## 2023-11-13 NOTE — TELEPHONE ENCOUNTER
Left VM for patient to contact my direct line to confirm which day she is coming into the office so that I can get her on the schedule. Dr. America Kendrick mentioned that patient would be coming in on Wednesday at the [facility]. Will await return phone call.

## 2023-11-22 ENCOUNTER — APPOINTMENT (OUTPATIENT)
Dept: LAB | Facility: CLINIC | Age: 53
End: 2023-11-22
Payer: COMMERCIAL

## 2023-11-22 DIAGNOSIS — I10 PRIMARY HYPERTENSION: ICD-10-CM

## 2023-11-22 DIAGNOSIS — C50.812 MALIGNANT NEOPLASM OF OVERLAPPING SITES OF BOTH BREASTS IN FEMALE, ESTROGEN RECEPTOR POSITIVE: ICD-10-CM

## 2023-11-22 DIAGNOSIS — C50.811 MALIGNANT NEOPLASM OF OVERLAPPING SITES OF BOTH BREASTS IN FEMALE, ESTROGEN RECEPTOR POSITIVE: ICD-10-CM

## 2023-11-22 DIAGNOSIS — Z17.0 MALIGNANT NEOPLASM OF OVERLAPPING SITES OF BOTH BREASTS IN FEMALE, ESTROGEN RECEPTOR POSITIVE: ICD-10-CM

## 2023-11-22 DIAGNOSIS — Z11.1 SCREENING-PULMONARY TB: ICD-10-CM

## 2023-11-22 DIAGNOSIS — Z11.59 NEED FOR HEPATITIS C SCREENING TEST: ICD-10-CM

## 2023-11-22 DIAGNOSIS — Z11.4 SCREENING FOR HIV (HUMAN IMMUNODEFICIENCY VIRUS): ICD-10-CM

## 2023-11-22 LAB
ALBUMIN SERPL BCP-MCNC: 4.2 G/DL (ref 3.5–5)
ALP SERPL-CCNC: 69 U/L (ref 34–104)
ALT SERPL W P-5'-P-CCNC: 11 U/L (ref 7–52)
ANION GAP SERPL CALCULATED.3IONS-SCNC: 5 MMOL/L
AST SERPL W P-5'-P-CCNC: 14 U/L (ref 13–39)
BASOPHILS # BLD AUTO: 0.04 THOUSANDS/ÂΜL (ref 0–0.1)
BASOPHILS NFR BLD AUTO: 1 % (ref 0–1)
BILIRUB SERPL-MCNC: 0.37 MG/DL (ref 0.2–1)
BUN SERPL-MCNC: 13 MG/DL (ref 5–25)
CALCIUM SERPL-MCNC: 9.1 MG/DL (ref 8.4–10.2)
CHLORIDE SERPL-SCNC: 104 MMOL/L (ref 96–108)
CHOLEST SERPL-MCNC: 175 MG/DL
CO2 SERPL-SCNC: 29 MMOL/L (ref 21–32)
CREAT SERPL-MCNC: 0.64 MG/DL (ref 0.6–1.3)
EOSINOPHIL # BLD AUTO: 0.13 THOUSAND/ÂΜL (ref 0–0.61)
EOSINOPHIL NFR BLD AUTO: 2 % (ref 0–6)
ERYTHROCYTE [DISTWIDTH] IN BLOOD BY AUTOMATED COUNT: 12.8 % (ref 11.6–15.1)
GFR SERPL CREATININE-BSD FRML MDRD: 102 ML/MIN/1.73SQ M
GLUCOSE P FAST SERPL-MCNC: 100 MG/DL (ref 65–99)
HCT VFR BLD AUTO: 42.5 % (ref 34.8–46.1)
HCV AB SER QL: NORMAL
HDLC SERPL-MCNC: 59 MG/DL
HGB BLD-MCNC: 13.3 G/DL (ref 11.5–15.4)
HIV 1+2 AB+HIV1 P24 AG SERPL QL IA: NORMAL
HIV 2 AB SERPL QL IA: NORMAL
HIV1 AB SERPL QL IA: NORMAL
HIV1 P24 AG SERPL QL IA: NORMAL
IMM GRANULOCYTES # BLD AUTO: 0.02 THOUSAND/UL (ref 0–0.2)
IMM GRANULOCYTES NFR BLD AUTO: 0 % (ref 0–2)
LDLC SERPL CALC-MCNC: 92 MG/DL (ref 0–100)
LYMPHOCYTES # BLD AUTO: 1.72 THOUSANDS/ÂΜL (ref 0.6–4.47)
LYMPHOCYTES NFR BLD AUTO: 27 % (ref 14–44)
MCH RBC QN AUTO: 28.7 PG (ref 26.8–34.3)
MCHC RBC AUTO-ENTMCNC: 31.3 G/DL (ref 31.4–37.4)
MCV RBC AUTO: 92 FL (ref 82–98)
MONOCYTES # BLD AUTO: 0.65 THOUSAND/ÂΜL (ref 0.17–1.22)
MONOCYTES NFR BLD AUTO: 10 % (ref 4–12)
NEUTROPHILS # BLD AUTO: 3.83 THOUSANDS/ÂΜL (ref 1.85–7.62)
NEUTS SEG NFR BLD AUTO: 60 % (ref 43–75)
NONHDLC SERPL-MCNC: 116 MG/DL
NRBC BLD AUTO-RTO: 0 /100 WBCS
PLATELET # BLD AUTO: 312 THOUSANDS/UL (ref 149–390)
PMV BLD AUTO: 8.5 FL (ref 8.9–12.7)
POTASSIUM SERPL-SCNC: 4.2 MMOL/L (ref 3.5–5.3)
PROT SERPL-MCNC: 7.2 G/DL (ref 6.4–8.4)
RBC # BLD AUTO: 4.63 MILLION/UL (ref 3.81–5.12)
SODIUM SERPL-SCNC: 138 MMOL/L (ref 135–147)
TRIGL SERPL-MCNC: 122 MG/DL
TSH SERPL DL<=0.05 MIU/L-ACNC: 0.78 UIU/ML (ref 0.45–4.5)
WBC # BLD AUTO: 6.39 THOUSAND/UL (ref 4.31–10.16)

## 2023-11-22 PROCEDURE — 80061 LIPID PANEL: CPT

## 2023-11-22 PROCEDURE — 85025 COMPLETE CBC W/AUTO DIFF WBC: CPT

## 2023-11-22 PROCEDURE — 36415 COLL VENOUS BLD VENIPUNCTURE: CPT

## 2023-11-22 PROCEDURE — 86803 HEPATITIS C AB TEST: CPT

## 2023-11-22 PROCEDURE — 86480 TB TEST CELL IMMUN MEASURE: CPT

## 2023-11-22 PROCEDURE — 80053 COMPREHEN METABOLIC PANEL: CPT

## 2023-11-22 PROCEDURE — 84443 ASSAY THYROID STIM HORMONE: CPT

## 2023-11-22 PROCEDURE — 87389 HIV-1 AG W/HIV-1&-2 AB AG IA: CPT

## 2023-11-22 PROCEDURE — 86300 IMMUNOASSAY TUMOR CA 15-3: CPT

## 2023-11-23 LAB
CANCER AG27-29 SERPL-ACNC: 17.6 U/ML (ref 0–38.6)
GAMMA INTERFERON BACKGROUND BLD IA-ACNC: <0 IU/ML
M TB IFN-G BLD-IMP: NEGATIVE
M TB IFN-G CD4+ BCKGRND COR BLD-ACNC: 0 IU/ML
M TB IFN-G CD4+ BCKGRND COR BLD-ACNC: 0 IU/ML
MITOGEN IGNF BCKGRD COR BLD-ACNC: 10 IU/ML

## 2023-11-27 ENCOUNTER — OFFICE VISIT (OUTPATIENT)
Dept: HEMATOLOGY ONCOLOGY | Facility: CLINIC | Age: 53
End: 2023-11-27
Payer: COMMERCIAL

## 2023-11-27 VITALS
HEIGHT: 67 IN | HEART RATE: 63 BPM | OXYGEN SATURATION: 97 % | TEMPERATURE: 97.7 F | BODY MASS INDEX: 31.01 KG/M2 | SYSTOLIC BLOOD PRESSURE: 120 MMHG | DIASTOLIC BLOOD PRESSURE: 76 MMHG | RESPIRATION RATE: 17 BRPM

## 2023-11-27 DIAGNOSIS — F32.A DEPRESSION, UNSPECIFIED DEPRESSION TYPE: ICD-10-CM

## 2023-11-27 DIAGNOSIS — C50.811 MALIGNANT NEOPLASM OF OVERLAPPING SITES OF BOTH BREASTS IN FEMALE, ESTROGEN RECEPTOR POSITIVE: Primary | ICD-10-CM

## 2023-11-27 DIAGNOSIS — Z17.0 MALIGNANT NEOPLASM OF OVERLAPPING SITES OF BOTH BREASTS IN FEMALE, ESTROGEN RECEPTOR POSITIVE: Primary | ICD-10-CM

## 2023-11-27 DIAGNOSIS — G47.00 INSOMNIA: ICD-10-CM

## 2023-11-27 DIAGNOSIS — Z51.5 PALLIATIVE CARE PATIENT: ICD-10-CM

## 2023-11-27 DIAGNOSIS — I10 PRIMARY HYPERTENSION: ICD-10-CM

## 2023-11-27 DIAGNOSIS — Z78.0 MENOPAUSE: ICD-10-CM

## 2023-11-27 DIAGNOSIS — F41.9 ANXIOUSNESS: ICD-10-CM

## 2023-11-27 DIAGNOSIS — C50.812 MALIGNANT NEOPLASM OF OVERLAPPING SITES OF BOTH BREASTS IN FEMALE, ESTROGEN RECEPTOR POSITIVE: ICD-10-CM

## 2023-11-27 DIAGNOSIS — C50.811 MALIGNANT NEOPLASM OF OVERLAPPING SITES OF BOTH BREASTS IN FEMALE, ESTROGEN RECEPTOR POSITIVE: ICD-10-CM

## 2023-11-27 DIAGNOSIS — F32.A DEPRESSION: ICD-10-CM

## 2023-11-27 DIAGNOSIS — C79.51 METASTASIS TO BONE (HCC): ICD-10-CM

## 2023-11-27 DIAGNOSIS — G89.3 CANCER RELATED PAIN: ICD-10-CM

## 2023-11-27 DIAGNOSIS — Z17.0 MALIGNANT NEOPLASM OF OVERLAPPING SITES OF BOTH BREASTS IN FEMALE, ESTROGEN RECEPTOR POSITIVE: ICD-10-CM

## 2023-11-27 DIAGNOSIS — G47.00 INSOMNIA, UNSPECIFIED TYPE: ICD-10-CM

## 2023-11-27 DIAGNOSIS — G89.28 CHRONIC POST-OPERATIVE PAIN: ICD-10-CM

## 2023-11-27 DIAGNOSIS — C50.812 MALIGNANT NEOPLASM OF OVERLAPPING SITES OF BOTH BREASTS IN FEMALE, ESTROGEN RECEPTOR POSITIVE: Primary | ICD-10-CM

## 2023-11-27 PROCEDURE — 99214 OFFICE O/P EST MOD 30 MIN: CPT | Performed by: INTERNAL MEDICINE

## 2023-11-27 RX ORDER — GABAPENTIN 300 MG/1
CAPSULE ORAL
Qty: 90 CAPSULE | Refills: 2 | Status: SHIPPED | OUTPATIENT
Start: 2023-11-27

## 2023-11-27 NOTE — PROGRESS NOTES
HPI:  In  July 2020 patient had bilateral mastectomies for bilateral breast cancers in [facility]. She states genetic testing was negative. She states she had invasive ductal carcinoma that was larger on the right side than left side. She states stage was 3 and lymph nodes were positive in right axilla but she does not know how many. After surgery she had radiation and she thinks that was given to the right side and she had that from October 2022- December 2020. Since that time she has been on tamoxifen 20 mg daily. She states she did not receive tamoxifen when she was getting radiation. Patient stopped having menstrual periods right after surgery even before she started tamoxifen. No menstrual periods in last 3 years. She could be switched to aromatase inhibitor but she states her doctor in [facility] wanted her to take tamoxifen for 5 years and then letrozole for 5 years. Patient's most recent bone scan and MRI scan of brain and right scapula has raised the possibility of may be bony metastatic lesion. I discussed with interventional radiologist and he reviewed the films and felt scapular lesion was too small to biopsy and he was not impressed with lesion in the calvarium. He felt these lesions may not have to be metastatic. I discussed this with the patient and plan is to repeat the scans. Tumor marker CA 27-29 is within normal range. Patient he is leaning towards changing from tamoxifen to AI now. She will have blood work to make sure she is postmenopausal.    Patient is advised to follow-up with a gynecologist because of tamoxifen and even otherwise. She states she has an appointment with a gynecologist.  Since surgery she has some pain in the mastectomy scars and scars were revised twice. She states gabapentin is helping and also she takes a CBD and has medical marijuana card. She has some pain in the lower ribs that started recently.   Initially it was on both side and now it is only on 1 side, left side. I suggested getting CT chest/ribs but she prefers to wait because pain is going away. She gives history of depression and has been on Effexor ER 75. Has  history of hypertension and seasonal allergies. Menarche at 16. She has 1 son and that was born when she was 30 and prior to that she had 2 miscarriages and had fertility treatments. She quit smoking cigarettes in  after smoking 1/3 pack of cigarette a day for 20 years. No alcohol. Sister has history of skin cancer. Maternal grandfather and paternal grandmother had lung cancers and they smoked cigarettes. Patient is allergic to latex and penicillin. ROS:  Reviewed 12 systems: See symptoms in HPI  Presently no other neurological, cardiac, pulmonary, GI and  symptoms other than listed in HPI. Other symptoms are in HPI. No  fever, chills, bleeding, skin rash, weight loss, night sweats, arthritic symptoms,  tiredness , weakness, numbness, claudication and gait problem. No frequent infections. Not unusually sensitive to heat or cold. No swelling of the ankles. No swollen glands. Patient is anxious.        Historical Information   Past Medical History:   Diagnosis Date    Allergies     Depression 2023    Malignant neoplasm of overlapping sites of both breasts in female, estrogen receptor positive ([address]) 2023    Primary hypertension 2023     Past Surgical History:   Procedure Laterality Date     SECTION  2006    MASTECTOMY Bilateral      Social History   Social History     Substance and Sexual Activity   Alcohol Use Not Currently     Social History     Substance and Sexual Activity   Drug Use Yes    Comment: CBD     Social History     Tobacco Use   Smoking Status Former    Types: Cigarettes    Start date:     Quit date:     Years since quittin.9   Smokeless Tobacco Never   Tobacco Comments    quit smoking cigarettes in  after smoking 1/3 pack of cigarette a day for 20 years Family History:   Family History   Problem Relation Age of Onset    Pulmonary embolism Mother 62    Diabetes Mother     Heart attack Father 61    Diabetes Father     Skin cancer Sister     Lung cancer Maternal Grandfather     Lung cancer Paternal Grandmother          Current Outpatient Medications:     cetirizine (ZyrTEC) 10 mg tablet, Take 10 mg by mouth daily, Disp: , Rfl:     gabapentin (NEURONTIN) 300 mg capsule, Take 1 capsule by mouth in the morning and, take 2 capsules by mouth at bedtime. , Disp: 90 capsule, Rfl: 2    lisinopril (ZESTRIL) 20 mg tablet, Take 1 tablet (20 mg total) by mouth daily, Disp: 90 tablet, Rfl: 1    ondansetron (ZOFRAN) 4 mg tablet, Take 1 tablet (4 mg total) by mouth every 8 (eight) hours as needed for nausea or vomiting, Disp: 20 tablet, Rfl: 2    tamoxifen (NOLVADEX) 20 mg tablet, Take 20 mg by mouth daily at bedtime, Disp: , Rfl:     venlafaxine (EFFEXOR-XR) 75 mg 24 hr capsule, Take 1 capsule (75 mg total) by mouth every evening, Disp: 30 capsule, Rfl: 5    Allergies   Allergen Reactions    Peanut-Containing Drug Products - Food Allergy Anaphylaxis and Throat Swelling    Latex Other (See Comments)     Per 4/24/23 Oncology note    Penicillins Other (See Comments)     Per 4/24/23 Oncology note       Physical Exam:   Vitals:    11/27/23 0853   BP: 120/76   BP Location: Left arm   Patient Position: Sitting   Cuff Size: Adult   Pulse: 63   Resp: 17   Temp: 97.7 °F (36.5 °C)   SpO2: 97%   Height: 5' 7" (1.702 m)     Alert and oriented and not in distress. Vitals as above. No icterus. No oral thrush. No palpable neck mass. Clear lung fields. Regular heart rate. Soft and nontender abdomen. No palpable abdominal mass. No ascites. No edema of the ankles. No calf tenderness. No focal neurological deficit, no skin rash, no palpable lymphadenopathy in the neck and axillary areas,  no clubbing. Patient is anxious. Performance status 0. No lymphedema.       Lab Results: No reported other than Oncotype score was 15  LABS:    Results for orders placed or performed in visit on 11/22/23   CBC and differential   Result Value Ref Range    WBC 6.39 4.31 - 10.16 Thousand/uL    RBC 4.63 3.81 - 5.12 Million/uL    Hemoglobin 13.3 11.5 - 15.4 g/dL    Hematocrit 42.5 34.8 - 46.1 %    MCV 92 82 - 98 fL    MCH 28.7 26.8 - 34.3 pg    MCHC 31.3 (L) 31.4 - 37.4 g/dL    RDW 12.8 11.6 - 15.1 %    MPV 8.5 (L) 8.9 - 12.7 fL    Platelets 237 321 - 697 Thousands/uL    nRBC 0 /100 WBCs    Neutrophils Relative 60 43 - 75 %    Immat GRANS % 0 0 - 2 %    Lymphocytes Relative 27 14 - 44 %    Monocytes Relative 10 4 - 12 %    Eosinophils Relative 2 0 - 6 %    Basophils Relative 1 0 - 1 %    Neutrophils Absolute 3.83 1.85 - 7.62 Thousands/µL    Immature Grans Absolute 0.02 0.00 - 0.20 Thousand/uL    Lymphocytes Absolute 1.72 0.60 - 4.47 Thousands/µL    Monocytes Absolute 0.65 0.17 - 1.22 Thousand/µL    Eosinophils Absolute 0.13 0.00 - 0.61 Thousand/µL    Basophils Absolute 0.04 0.00 - 0.10 Thousands/µL   Comprehensive metabolic panel   Result Value Ref Range    Sodium 138 135 - 147 mmol/L    Potassium 4.2 3.5 - 5.3 mmol/L    Chloride 104 96 - 108 mmol/L    CO2 29 21 - 32 mmol/L    ANION GAP 5 mmol/L    BUN 13 5 - 25 mg/dL    Creatinine 0.64 0.60 - 1.30 mg/dL    Glucose, Fasting 100 (H) 65 - 99 mg/dL    Calcium 9.1 8.4 - 10.2 mg/dL    AST 14 13 - 39 U/L    ALT 11 7 - 52 U/L    Alkaline Phosphatase 69 34 - 104 U/L    Total Protein 7.2 6.4 - 8.4 g/dL    Albumin 4.2 3.5 - 5.0 g/dL    Total Bilirubin 0.37 0.20 - 1.00 mg/dL    eGFR 102 ml/min/1.73sq m   Cancer antigen 27.29   Result Value Ref Range    CA 27-29 17.6 0.0 - 38.6 U/mL   Hepatitis C Antibody   Result Value Ref Range    Hepatitis C Ab Non-reactive Non-Reactive   HIV 1/2 AG/AB w Reflex [facility] for 2 yr old and above   Result Value Ref Range    HIV-1 p24 Antigen Non-Reactive Non-Reactive    HIV-1 Antibody Non-Reactive Non-Reactive    HIV-2 Antibody [facility] Non-Reactive    HIV Ag-Ab 5th Gen Non-Reactive Non-Reactive   Lipid panel   Result Value Ref Range    Cholesterol 175 See Comment mg/dL    Triglycerides 122 See Comment mg/dL    HDL, Direct 59 >=50 mg/dL    LDL Calculated 92 0 - 100 mg/dL    Non-HDL-Chol (CHOL-HDL) 116 mg/dl   TSH, 3rd generation   Result Value Ref Range    TSH 3RD GENERATON 0.782 0.450 - 4.500 uIU/mL   Quantiferon TB Gold Plus Gray   Result Value Ref Range    QFT Nil <0.00 0 - 8.0 IU/ml   Quantiferon TB Gold Plus Green   Result Value Ref Range    QFT TB1-NIL 0.00 IU/ml   Quantiferon TB Gold Plus Yellow   Result Value Ref Range    QFT TB2-NIL 0.00 IU/ml   Quantiferon TB Gold Plus Purple   Result Value Ref Range    QFT Mitogen-NIL 10.00 IU/ml    QFT Final Interpretation Negative Negative         Imaging Studies:   MPRESSION:     Indeterminate foci of tracer activity involving the right scapula and calvarium, see above for differential diagnosis which includes osseous metastatic disease. There is no cross-sectional comparison imaging to correlate with these indeterminate   findings. Consider further evaluation with MRI (PET/CT as clinically indicated) as an initial first step for further characterization as clinically indicated. The study was marked in EPIC for significant notification. Workstation performed: [id number]        Imaging    NM bone scan whole body (Order: [id number]) - 10/24/2023    MPRESSION:  No metastatic disease evident. The focus of abnormal tracer activity of the calvarium on the 10/24/2023 examination is quite small so difficult to exclude metastasis based upon this examination. Minimal altered signal involving white matter discussed above is nonspecific regarding etiology; findings most often relating to chronic small vessel white matter ischemic disease. Evidence of mild symmetric bilateral proptosis without other orbital abnormality evident; a finding often associated with thyroid orbitopathy. Incidental benign osteoma right parietal calvarium. Hemalatha Beckford M.D., [facility]  Senior Member, American Society of Neuroradiology     Workstation performed: [id number]        Imaging    MRI brain w wo contrast (Order: [id number]) - 11/2/2023    Small subcentimeter bone lesion within the scapula exhibiting post gadolinium enhancement that appears to correspond to a focus of increased radiotracer uptake on recent bone scan study. Metastatic disease is of concern in a patient with history of   breast carcinoma. No additional bone lesions identified. Workstation performed: [id number]        Imaging    MRI shoulder right w wo contrast (Order: [id number]) - 11/2/2023    MPRESSION:     Mild diffuse hepatic steatosis. 1.2 cm nonobstructing calcification in the midpole of the right kidney. No hydronephrosis bilaterally. No sonographic evidence for cholelithiasis, acute cholecystitis, or significant biliary ductal dilatation. Workstation performed: [id number]        Imaging    US abdomen complete (Order: [id number]) - 10/24/2023  Pathology, and Other Studies: No reported other than Oncotype score was 15      Assessment and Plan:  See diagnoses, orders instructions below   In  July 2020 patient had bilateral mastectomies for bilateral breast cancers in [facility]. She states genetic testing was negative. She states she had invasive ductal carcinoma that was larger on the right side than left side. She states stage was 3 and lymph nodes were positive in right axilla but she does not know how many. After surgery she had radiation and she thinks that was given to the right side and she had that from October 2022- December 2020. Since that time she has been on tamoxifen 20 mg daily. She states she did not receive tamoxifen when she was getting radiation. Patient stopped having menstrual periods right after surgery even before she started tamoxifen. No menstrual periods in last 3 years. She could be switched to aromatase inhibitor but she states her doctor in [facility] wanted her to take tamoxifen for 5 years and then letrozole for 5 years. Patient's most recent bone scan and MRI scan of brain and right scapula has raised the possibility of may be bony metastatic lesion. I discussed with interventional radiologist and he reviewed the films and felt scapular lesion was too small to biopsy and he was not impressed with lesion in the calvarium. He felt these lesions may not have to be metastatic. I discussed this with the patient and plan is to repeat the scans. Tumor marker CA 27-29 is within normal range. Patient he is leaning towards changing from tamoxifen to AI now. She will have blood work to make sure she is postmenopausal.    Patient is advised to follow-up with a gynecologist because of tamoxifen and even otherwise. She states she has an appointment with a gynecologist.  Since surgery she has some pain in the mastectomy scars and scars were revised twice. She states gabapentin is helping and also she takes a CBD and has medical marijuana card. She has some pain in the lower ribs that started recently. Initially it was on both side and now it is only on 1 side, left side. I suggested getting CT chest/ribs but she prefers to wait because pain is going away. She gives history of depression and has been on Effexor ER 75. Has  history of hypertension and seasonal allergies. Menarche at 16. She has 1 son and that was born when she was 30 and prior to that she had 2 miscarriages and had fertility treatments. She quit smoking cigarettes in 2005 after smoking 1/3 pack of cigarette a day for 20 years. No alcohol. Sister has history of skin cancer. Maternal grandfather and paternal grandmother had lung cancers and they smoked cigarettes. Patient is allergic to latex and penicillin. Physical examination as recorded and discussed. Discussed above with patient in detail. She will have scans again. She will have additional blood work. See diagnoses, orders and instructions below. Discussed healthy diet and activities as tolerated and health screening test including GYN examination and colonoscopy. Patient is capable of self-care. Goal is cure from breast cancers. She states her genetic test was negative. All discussed in detail. Questions answered. 1. Malignant neoplasm of overlapping sites of both breasts in female, estrogen receptor positive     - Estradiol; Future  - Follicle stimulating hormone; Future  - MRI brain wo contrast; Future  - MRI shoulder right wo contrast; Future  - NM bone scan whole body; Future    2. Primary hypertension      3. Metastasis to bone [facility])? - MRI brain wo contrast; Future  - MRI shoulder right wo contrast; Future  - NM bone scan whole body; Future    4. Depression, unspecified depression type      5. Insomnia, unspecified type      6. Cancer related pain      7. Menopause    - Estradiol; Future  - Follicle stimulating hormone; Future    Blood work this week. Scans in first week of February. She is taking tamoxifen. She has information on aromatase inhibitors. Follow-up in middle of February 2024. Patient will continue to follow with  primary physician and other consultants. Patient voiced understanding and agrees      Disclaimer: This document was prepared using a dictation device. If a word or phrase is confusing, or does not make sense, this is likely due to recognition error which was not discovered during the providers review. If you believe an error has occurred, please Contact me through [employer] service for ryley? cation. Counseling / Coordination of Care  . Olya Barton   Provided counseling and support

## 2023-11-27 NOTE — PATIENT INSTRUCTIONS
Blood work this week. Scans in first week of February. She is taking tamoxifen. She has information on aromatase inhibitors. Follow-up in middle of February 2024.

## 2023-11-28 ENCOUNTER — APPOINTMENT (OUTPATIENT)
Dept: LAB | Facility: CLINIC | Age: 53
End: 2023-11-28
Payer: COMMERCIAL

## 2023-11-28 DIAGNOSIS — Z78.0 MENOPAUSE: ICD-10-CM

## 2023-11-28 DIAGNOSIS — C50.811 MALIGNANT NEOPLASM OF OVERLAPPING SITES OF BOTH BREASTS IN FEMALE, ESTROGEN RECEPTOR POSITIVE: ICD-10-CM

## 2023-11-28 DIAGNOSIS — C50.812 MALIGNANT NEOPLASM OF OVERLAPPING SITES OF BOTH BREASTS IN FEMALE, ESTROGEN RECEPTOR POSITIVE: ICD-10-CM

## 2023-11-28 DIAGNOSIS — Z17.0 MALIGNANT NEOPLASM OF OVERLAPPING SITES OF BOTH BREASTS IN FEMALE, ESTROGEN RECEPTOR POSITIVE: ICD-10-CM

## 2023-11-28 LAB
ESTRADIOL SERPL-MCNC: 17.4 PG/ML
FSH SERPL-ACNC: 8.1 MIU/ML

## 2023-11-28 PROCEDURE — 82670 ASSAY OF TOTAL ESTRADIOL: CPT

## 2023-11-28 PROCEDURE — 83001 ASSAY OF GONADOTROPIN (FSH): CPT

## 2023-11-28 PROCEDURE — 36415 COLL VENOUS BLD VENIPUNCTURE: CPT

## 2023-11-29 ENCOUNTER — TELEPHONE (OUTPATIENT)
Dept: HEMATOLOGY ONCOLOGY | Facility: CLINIC | Age: 53
End: 2023-11-29

## 2023-11-29 NOTE — TELEPHONE ENCOUNTER
Dr. Vivian Reyes reviewed patient's lab results. She is still premenopausal. He would like for her to stay on Tamoxifen. She verbalized understanding and agreed to plan.

## 2024-01-15 ENCOUNTER — OFFICE VISIT (OUTPATIENT)
Dept: PALLIATIVE MEDICINE | Facility: CLINIC | Age: 54
End: 2024-01-15
Payer: COMMERCIAL

## 2024-01-15 VITALS
HEIGHT: 67 IN | WEIGHT: 203 LBS | OXYGEN SATURATION: 95 % | SYSTOLIC BLOOD PRESSURE: 130 MMHG | TEMPERATURE: 97.8 F | DIASTOLIC BLOOD PRESSURE: 78 MMHG | BODY MASS INDEX: 31.86 KG/M2 | HEART RATE: 109 BPM

## 2024-01-15 DIAGNOSIS — R63.0 LOSS OF APPETITE: ICD-10-CM

## 2024-01-15 DIAGNOSIS — Z71.89 ADVANCED CARE PLANNING/COUNSELING DISCUSSION: ICD-10-CM

## 2024-01-15 DIAGNOSIS — F32.A DEPRESSION, UNSPECIFIED DEPRESSION TYPE: ICD-10-CM

## 2024-01-15 DIAGNOSIS — C50.812 MALIGNANT NEOPLASM OF OVERLAPPING SITES OF BOTH BREASTS IN FEMALE, ESTROGEN RECEPTOR POSITIVE: Primary | ICD-10-CM

## 2024-01-15 DIAGNOSIS — F41.9 ANXIETY: ICD-10-CM

## 2024-01-15 DIAGNOSIS — Z79.899 MEDICAL MARIJUANA USE: ICD-10-CM

## 2024-01-15 DIAGNOSIS — G89.28 CHRONIC POST-OPERATIVE PAIN: ICD-10-CM

## 2024-01-15 DIAGNOSIS — Z51.5 PALLIATIVE CARE PATIENT: ICD-10-CM

## 2024-01-15 DIAGNOSIS — G47.00 INSOMNIA: ICD-10-CM

## 2024-01-15 DIAGNOSIS — G89.3 CANCER RELATED PAIN: ICD-10-CM

## 2024-01-15 DIAGNOSIS — Z71.89 GOALS OF CARE, COUNSELING/DISCUSSION: ICD-10-CM

## 2024-01-15 DIAGNOSIS — C50.811 MALIGNANT NEOPLASM OF OVERLAPPING SITES OF BOTH BREASTS IN FEMALE, ESTROGEN RECEPTOR POSITIVE: Primary | ICD-10-CM

## 2024-01-15 DIAGNOSIS — Z17.0 MALIGNANT NEOPLASM OF OVERLAPPING SITES OF BOTH BREASTS IN FEMALE, ESTROGEN RECEPTOR POSITIVE: Primary | ICD-10-CM

## 2024-01-15 PROCEDURE — 99215 OFFICE O/P EST HI 40 MIN: CPT | Performed by: INTERNAL MEDICINE

## 2024-01-15 PROCEDURE — 99497 ADVNCD CARE PLAN 30 MIN: CPT | Performed by: INTERNAL MEDICINE

## 2024-01-15 RX ORDER — VENLAFAXINE HYDROCHLORIDE 75 MG/1
75 CAPSULE, EXTENDED RELEASE ORAL EVERY EVENING
Qty: 30 CAPSULE | Refills: 5 | Status: SHIPPED | OUTPATIENT
Start: 2024-01-15

## 2024-01-15 NOTE — ASSESSMENT & PLAN NOTE
Patient uses [facility] pharmacy for most medications. She states she needs to use this pharmacy per her insurer. She is on her ex-'s insurance as stipulated by a court order (per patient's statements today).  Patient requested handicapped placard; form provided, sections required by physician completed.  Emotional / psychosocial support provided today.  Reviewed notes (Medical Oncology), labs (11/22/23 Cr 0.64, alb 4.2, CA 27-29 17.6, Hb 13.3, TSH 0.782), imaging + procedures (11/2/23 MRI R shoulder + MRI brain; 10/24/23 bone scan + US abdomen).

## 2024-01-15 NOTE — ASSESSMENT & PLAN NOTE
Continue venlafaxine for depression, anxiousness.  Offered increase, decrease (as she feels venlafaxine may have worsened her depression), and alternate agents. Patient declines all offered changes.  Counseled at length.  Patient has notable apprehension about potential adverse effects of any mood agent.  Referring to JOJO MORLEY.

## 2024-01-15 NOTE — ASSESSMENT & PLAN NOTE
May use MMJ for appetite stimulation.  Patient does not wish to explore alternative medications for this issue.

## 2024-01-15 NOTE — ASSESSMENT & PLAN NOTE
Patient states she was told by her new PCP that she could continue taking lisinopril in split doses (20mg AM, 10mg PM) as she states she had been taking before she came to [facility]; orders and notes do not reflect this and lisinopril is a once-daily medication.

## 2024-01-15 NOTE — ASSESSMENT & PLAN NOTE
May use MMJ products; she has been using an [employer]-based vape recently.  Venlafaxine and gabapentin may help w/ mood and sleep.  Patient declines offer of adjusting dose of mood/sleep medications, or trial of alternate medications.

## 2024-01-15 NOTE — PATIENT INSTRUCTIONS
"It was good to see you today. Thank you for coming in.    Thank you for completing the the [facility] Advanced Directive document. After it is signed and witnessed we will scan it in to the system.  Reach out to your PCP Dr Nati Garcia re: lisinopril - [phone].  Handicapped placard provided. You will need to mail the completed form to the DMV.  Referring you to our Palliative Social Work for psychotherapy support for anxiety and depression.  Let me know if you would like to switch from venlafaxine to other mood medication (Cymbalta, Lexapro, Zoloft, Celexa, Prozac, Abilify, Wellbutrin, Buspar).  Let me know if you would like to increase gabapentin.  In addition to (or instead of) prescription medications for nausea, some over-the-counter and non-pharmacological solutions may help.  Consider \"Queasy drops\" or \"Queasy pops\" (available at most pharmacies or in the \"nausea remedies\" sections of larger stores like [employer]).  Ginger and peppermint may help reduce nausea.  IBgard may help as well (peppermint is an active ingredient in this over-the-counter product).  You may also note that if you have altered taste sensation, switching from silverware to plasticware or bamboo cutlery may help.  Consider Pepcid or other over-the-counter product for reflux.  Return in about 3 months (around 4/15/2024).  Call us for refills on medications that we supply, as needed.  If something changes and you need to come in sooner, please call our office.    PRESCRIPTION REFILL REMINDER:  All medication refills should be requested prior to Noon on Friday. Any refill requests after noon on Friday would be addressed the following Monday.    MEDICATION SAFETY ISSUES:  Do not drive under the influence of narcotics (including marijuana), watch for adverse effects including confusion / altered mental status / respiratory depression (slowed breathing), keep medications stored in a safe/locked environment, do not use alcohol while opioids or other " narcotics are in your system.

## 2024-01-15 NOTE — ASSESSMENT & PLAN NOTE
May use MMJ products for anxiousness.  Continue venlafaxine for depression, anxiousness. Gabapentin may also help.  Offered increase in venlafaxine, decrease in venlafaxine, alternate agents. Patient dexclines all offered changes.  Counseled at length  Patient has notable apprehension about potential adverse effects of any mood agent.  Referring to PSC MILLI.

## 2024-01-15 NOTE — ASSESSMENT & PLAN NOTE
Separate 20 minutes spent reviewing and completing advanced directives (the [facility] Advanced Directive). Advanced directive counseling given. Reviewed with patient. All questions answered. Document signed and witnessed and will be scanned into the chart.

## 2024-01-15 NOTE — ASSESSMENT & PLAN NOTE
Continue gabapentin for chronic pain. Patient declines offer of increase in this medication, or other change in analgesic regimen.  Consider topical OTC products, local application of heat or cold, Tylenol up to 1000mg TID for chronic pain. Do not use heat on top of topical agents. Do not mix topical agents.  MMJ products, including topical products, may help with pain.

## 2024-01-15 NOTE — ASSESSMENT & PLAN NOTE
Continue MMJ for symptom relief. 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  Patient feels MMJ is insufficient for her symptom control, but does not wish to change her other symptom medications d/t concerns about potential adverse effects.  Patient certified for MMJ in the State of [place] on 6/28/23.

## 2024-01-15 NOTE — ASSESSMENT & PLAN NOTE
Discussed goals today; goals are murky. Patient states she does not want a biopsy but also expresses frustration that no treatment would be offered without a biopsy.  Patient expresses her frustration w/ [facility], in particular the [phone] system for contacting providers, scheduling visits.

## 2024-01-15 NOTE — PROGRESS NOTES
Follow-up with Palliative and Supportive Care  Magui Wren 53 y.o. female [id number]    ASSESSMENT & PLAN:    1. Malignant neoplasm of overlapping sites of both breasts in female, estrogen receptor positive   Assessment & Plan:  Continue disease-directed cares overall.    Orders:  -     venlafaxine (EFFEXOR-XR) 75 mg 24 hr capsule; Take 1 capsule (75 mg total) by mouth every evening    2. Depression, unspecified depression type  Assessment & Plan:  Continue venlafaxine for depression, anxiousness.  Offered increase, decrease (as she feels venlafaxine may have worsened her depression), and alternate agents. Patient declines all offered changes.  Counseled at length.  Patient has notable apprehension about potential adverse effects of any mood agent.  Referring to [facility].    Orders:  -     venlafaxine (EFFEXOR-XR) 75 mg 24 hr capsule; Take 1 capsule (75 mg total) by mouth every evening    3. Anxiety  Assessment & Plan:  May use MMJ products for anxiousness.  Continue venlafaxine for depression, anxiousness. Gabapentin may also help.  Offered increase in venlafaxine, decrease in venlafaxine, alternate agents. Patient dexclines all offered changes.  Counseled at length  Patient has notable apprehension about potential adverse effects of any mood agent.  Referring to [facility].    Orders:  -     venlafaxine (EFFEXOR-XR) 75 mg 24 hr capsule; Take 1 capsule (75 mg total) by mouth every evening    4. Cancer related pain  Assessment & Plan:  Continue gabapentin for chronic pain. Patient declines offer of increase in this medication, or other change in analgesic regimen.  Consider topical OTC products, local application of heat or cold, Tylenol up to 1000mg TID for chronic pain. Do not use heat on top of topical agents. Do not mix topical agents.  MMJ products, including topical products, may help with pain.    Orders:  -     venlafaxine (EFFEXOR-XR) 75 mg 24 hr capsule; Take 1 capsule (75 mg total) by mouth every  evening    5. Chronic post-operative pain  Assessment & Plan:  Continue gabapentin for chronic pain. Patient declines offer of increase in gabapentin, or change to alternate agent.  Consider topical OTC products, local application of heat or cold, Tylenol up to 1000mg TID for chronic pain. Do not use heat on top of topical agents. Do not mix topical agents.  MMJ products, including topical products, may help with pain.    Orders:  -     venlafaxine (EFFEXOR-XR) 75 mg 24 hr capsule; Take 1 capsule (75 mg total) by mouth every evening    6. Insomnia  Assessment & Plan:  May use MMJ products; she has been using an [employer]-based vape recently.  Venlafaxine and gabapentin may help w/ mood and sleep.  Patient declines offer of adjusting dose of mood/sleep medications, or trial of alternate medications.    Orders:  -     venlafaxine (EFFEXOR-XR) 75 mg 24 hr capsule; Take 1 capsule (75 mg total) by mouth every evening    7. Loss of appetite  Assessment & Plan:  May use MMJ for appetite stimulation.  Patient does not wish to explore alternative medications for this issue.      8. Medical marijuana use  Assessment & Plan:  Continue MMJ for symptom relief. 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  Patient feels MMJ is insufficient for her symptom control, but does not wish to change her other symptom medications d/t concerns about potential adverse effects.  Patient certified for MMJ in the State of [place] on 6/28/23.      9. Palliative care patient  Assessment & Plan:  [facility] most medications. She states she needs to use this pharmacy per her insurer. She is on her ex-'s insurance as stipulated by a court order (per patient's statements today).  Patient requested handicapped placard; form provided, sections required by physician completed.  Emotional / psychosocial  support provided today.  Reviewed notes (Medical Oncology), labs (11/22/23 Cr 0.64, alb 4.2, CA 27-29 17.6, Hb 13.3, TSH 0.782), imaging + procedures (11/2/23 MRI R shoulder + MRI brain; 10/24/23 bone scan + US abdomen).    Orders:  -     venlafaxine (EFFEXOR-XR) 75 mg 24 hr capsule; Take 1 capsule (75 mg total) by mouth every evening    10. Advanced care planning/counseling discussion  Assessment & Plan:  Separate 20 minutes spent reviewing and completing advanced directives (the [facility] Advanced Directive). Advanced directive counseling given. Reviewed with patient. All questions answered. Document signed and witnessed and will be scanned into the chart.      11. Goals of care, counseling/discussion  Assessment & Plan:  Discussed goals today; goals are murky. Patient states she does not want a biopsy but also expresses frustration that no treatment would be offered without a biopsy.  Patient expresses her frustration w/ [facility], in particular the 4-751 system for contacting providers, scheduling visits.      Return in about 3 months (around 4/15/2024).  Medication safety issues addressed - no driving under the influence of narcotics (including opioids), watch for adverse effects including AMS or respiratory depression (slowed breathing), keep medications stored in a safe/locked environment, do not use alcohol while opioids or other narcotics are in one's system.      Requested Prescriptions     Signed Prescriptions Disp Refills    venlafaxine (EFFEXOR-XR) 75 mg 24 hr capsule 30 capsule 5     Sig: Take 1 capsule (75 mg total) by mouth every evening       Medications Discontinued During This Encounter   Medication Reason    ondansetron (ZOFRAN) 4 mg tablet     venlafaxine (EFFEXOR-XR) 75 mg 24 hr capsule Reorder       Representatives have queried the patient's controlled substance dispensing history in the Prescription Drug Monitoring Program in compliance with regulations before I have prescribed any controlled  "substances. The prescription history is consistent with prescribed therapy and our practice policies.      40+ minutes (separate from and in addition to ACP time detailed above) were spent in this ambulatory visit with greater than 50% of the time spent face to face with patient in counseling or coordination of care including symptom assessment and management, medication review, psychosocial support, chart review, imaging review, lab review, advanced directives, goals of care, medical marijuana, supportive listening, and anticipatory guidance. All of the patient's questions were answered during this discussion.    SUBJECTIVE:  Chief Complaint   Patient presents with    Follow-up        HPI    Magui Wren is a 53 y.o. female w/ bilateral breast cancer (diagnosed 07/2020 as invasive ductal carcinoma) s/p bilateral mastectomies + RT, on tamoxifen; depression, HTN, seasonal allergies. She follows w/ Dr Boyle (Medical Oncology). She moved to [place] from [place] in 10/2022. Certified for MMJ in the State of [place] 6/28/23 for assistance with “chronic pain, loss of appetite, anxiousness, insomnia, depressed mood”.    Patient is known to [facility] clinic; seen 9/25/23 for symptom assessment and management, medication review, psychosocial support, chart review, imaging review, lab review, advanced directives, medical marijuana, supportive listening and anticipatory guidance.    Patient endorses 6/10 pain today, focused in her chest and in her right shoulder. She endorse anxiety in regards to her health; she is worried about the 7 mm lesion in her right scapula (noted on imaging). Some of her pain may be related to this. Though the read on her recent brain MRI states \"no metastatic disease evident\", she has concerns regarding a brain lesion. At different times in today's visit she states she does not want to have a biopsy, she does not want chemotherapy or other systemic treatment, and that she is frustrated she is not being " "offered treatment for the \"two lesions\".    Patient is significantly anxious today, and her anxiety and depression are impacting her quality of life. She states that when she had lived in [facility] or in [place] she had psychotherapy (which was very helpful to her). She states that she has never been on any mood medicine other than marijuana prior than coming to [place]. She states she feels that since starting Effexor her depression has worsened.    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    Patient states, \"gabapentin upsets my stomach\" she states she has \"no appetite\". She endorses some mild reflux but does not wish to take medication for this.     Patient states she is using a CBD product which is \"ineffective\", she tried \"edibles\" which have not been effective recently, and she is now trying a 1-1 THC to CBD mix product.    PDMP shows no concerns.    Review of Systems   All other systems reviewed and are negative.    The following portions of the medical history were reviewed: past medical history, surgical history, problem list, medication list, family history, and social history.      Current Outpatient Medications:     cetirizine (ZyrTEC) 10 mg tablet, Take 10 mg by mouth daily, Disp: , Rfl:     gabapentin (NEURONTIN) 300 mg capsule, Take 1 capsule by mouth in the morning and, take 2 capsules by mouth at bedtime., Disp: 90 capsule, Rfl: 2    lisinopril (ZESTRIL) 20 mg tablet, Take 1 tablet (20 mg total) by mouth daily, Disp: 90 tablet, Rfl: 1    tamoxifen (NOLVADEX) 20 mg tablet, Take 20 mg by mouth daily at bedtime, Disp: , Rfl:     venlafaxine (EFFEXOR-XR) 75 mg 24 hr capsule, Take 1 capsule (75 mg total) by mouth every evening, Disp: 30 capsule, Rfl: 5    OBJECTIVE:  /78 (BP Location: Left arm, Patient Position: Sitting, Cuff Size: Large)   Pulse (!) 109   Temp 97.8 °F (36.6 °C) (Temporal)   Ht 5' 7\" (1.702 " "m)   Wt 92.1 kg (203 lb)   SpO2 95%   BMI 31.79 kg/m²   Physical Exam  Vitals reviewed.   Constitutional:       General: She is not in acute distress.     Appearance: She is well-groomed and overweight. She is not toxic-appearing.   HENT:      Head: Normocephalic and atraumatic.      Right Ear: External ear normal.      Left Ear: External ear normal.   Eyes:      General: No scleral icterus.        Right eye: No discharge.         Left eye: No discharge.      Extraocular Movements: Extraocular movements intact.      Conjunctiva/sclera: Conjunctivae normal.      Pupils: Pupils are equal, round, and reactive to light.   Cardiovascular:      Rate and Rhythm: Tachycardia present.   Pulmonary:      Effort: Pulmonary effort is normal. No tachypnea, bradypnea, accessory muscle usage or respiratory distress.      Comments: Able to speak comfortably in complete sentences on room air at rest.  Abdominal:      General: There is no distension.   Musculoskeletal:      Cervical back: Normal range of motion.      Right lower leg: No edema.      Left lower leg: No edema.   Skin:     General: Skin is dry.      Coloration: Skin is not pale.   Neurological:      Mental Status: She is alert and oriented to person, place, and time.      Cranial Nerves: No dysarthria or facial asymmetry.      Gait: Gait normal.      Comments: Using no assistive devices for ambulation.   Psychiatric:         Attention and Perception: Attention normal.         Mood and Affect: Mood is anxious and depressed. Affect is labile and tearful.         Behavior: Behavior is agitated. Behavior is cooperative.         Thought Content: Thought content normal.         Cognition and Memory: Cognition and memory normal.          Gideon Smith MD  [facility] Palliative and Supportive [facility]  [phone]    Portions of this document may have been created using dictation software and as such some \"sound alike\" terms may have been generated by the system. Do not " hesitate to contact me with any questions or clarifications.     This note was not shared with the patient due to reasonable likelihood of causing patient harm.

## 2024-01-15 NOTE — ASSESSMENT & PLAN NOTE
Continue gabapentin for chronic pain. Patient declines offer of increase in gabapentin, or change to alternate agent.  Consider topical OTC products, local application of heat or cold, Tylenol up to 1000mg TID for chronic pain. Do not use heat on top of topical agents. Do not mix topical agents.  MMJ products, including topical products, may help with pain.

## 2024-01-15 NOTE — Clinical Note
Please connect with this patient for psychotherapy, she would prefer to be called to schedule as she has had bad experiences getting appointments on her own (frustrations w/ [phone] number, etc).

## 2024-01-29 ENCOUNTER — TELEPHONE (OUTPATIENT)
Dept: PALLIATIVE MEDICINE | Facility: CLINIC | Age: 54
End: 2024-01-29

## 2024-02-06 ENCOUNTER — HOSPITAL ENCOUNTER (OUTPATIENT)
Dept: NUCLEAR MEDICINE | Facility: HOSPITAL | Age: 54
Discharge: HOME | End: 2024-02-06
Attending: INTERNAL MEDICINE
Payer: COMMERCIAL

## 2024-02-06 ENCOUNTER — HOSPITAL ENCOUNTER (OUTPATIENT)
Dept: MRI IMAGING | Facility: HOSPITAL | Age: 54
Discharge: HOME | End: 2024-02-06
Attending: INTERNAL MEDICINE
Payer: COMMERCIAL

## 2024-02-06 ENCOUNTER — HOSPITAL ENCOUNTER (OUTPATIENT)
Dept: RADIOLOGY | Facility: HOSPITAL | Age: 54
Discharge: HOME | End: 2024-02-06
Payer: COMMERCIAL

## 2024-02-06 DIAGNOSIS — Z17.0 MALIGNANT NEOPLASM OF OVERLAPPING SITES OF BOTH BREASTS IN FEMALE, ESTROGEN RECEPTOR POSITIVE: ICD-10-CM

## 2024-02-06 DIAGNOSIS — C50.812 MALIGNANT NEOPLASM OF OVERLAPPING SITES OF BOTH BREASTS IN FEMALE, ESTROGEN RECEPTOR POSITIVE: ICD-10-CM

## 2024-02-06 DIAGNOSIS — C79.51 METASTASIS TO BONE (HCC): ICD-10-CM

## 2024-02-06 DIAGNOSIS — C50.811 MALIGNANT NEOPLASM OF OVERLAPPING SITES OF BOTH BREASTS IN FEMALE, ESTROGEN RECEPTOR POSITIVE: ICD-10-CM

## 2024-02-06 DIAGNOSIS — G89.3 CANCER RELATED PAIN: ICD-10-CM

## 2024-02-06 PROCEDURE — 78306 BONE IMAGING WHOLE BODY: CPT

## 2024-02-06 PROCEDURE — A9585 GADOBUTROL INJECTION: HCPCS | Performed by: FAMILY MEDICINE

## 2024-02-06 PROCEDURE — G1004 CDSM NDSC: HCPCS

## 2024-02-06 PROCEDURE — 71046 X-RAY EXAM CHEST 2 VIEWS: CPT

## 2024-02-06 PROCEDURE — A9503 TC99M MEDRONATE: HCPCS

## 2024-02-06 PROCEDURE — 73223 MRI JOINT UPR EXTR W/O&W/DYE: CPT

## 2024-02-06 RX ORDER — GADOBUTROL 604.72 MG/ML
9 INJECTION INTRAVENOUS
Status: COMPLETED | OUTPATIENT
Start: 2024-02-06 | End: 2024-02-06

## 2024-02-06 RX ADMIN — GADOBUTROL 9 ML: 604.72 INJECTION INTRAVENOUS at 20:48

## 2024-02-09 ENCOUNTER — APPOINTMENT (OUTPATIENT)
Dept: MRI IMAGING | Facility: HOSPITAL | Age: 54
End: 2024-02-09
Attending: INTERNAL MEDICINE
Payer: COMMERCIAL

## 2024-02-09 ENCOUNTER — HOSPITAL ENCOUNTER (OUTPATIENT)
Dept: MRI IMAGING | Facility: HOSPITAL | Age: 54
Discharge: HOME | End: 2024-02-09
Attending: INTERNAL MEDICINE
Payer: COMMERCIAL

## 2024-02-09 DIAGNOSIS — C50.811 MALIGNANT NEOPLASM OF OVERLAPPING SITES OF BOTH BREASTS IN FEMALE, ESTROGEN RECEPTOR POSITIVE: ICD-10-CM

## 2024-02-09 DIAGNOSIS — C79.51 METASTASIS TO BONE (HCC): ICD-10-CM

## 2024-02-09 DIAGNOSIS — C50.812 MALIGNANT NEOPLASM OF OVERLAPPING SITES OF BOTH BREASTS IN FEMALE, ESTROGEN RECEPTOR POSITIVE: ICD-10-CM

## 2024-02-09 DIAGNOSIS — Z17.0 MALIGNANT NEOPLASM OF OVERLAPPING SITES OF BOTH BREASTS IN FEMALE, ESTROGEN RECEPTOR POSITIVE: ICD-10-CM

## 2024-02-09 PROCEDURE — G1004 CDSM NDSC: HCPCS

## 2024-02-09 PROCEDURE — A9585 GADOBUTROL INJECTION: HCPCS | Performed by: INTERNAL MEDICINE

## 2024-02-09 PROCEDURE — 70553 MRI BRAIN STEM W/O & W/DYE: CPT

## 2024-02-09 RX ORDER — GADOBUTROL 604.72 MG/ML
9 INJECTION INTRAVENOUS
Status: COMPLETED | OUTPATIENT
Start: 2024-02-09 | End: 2024-02-09

## 2024-02-09 RX ADMIN — GADOBUTROL 9 ML: 604.72 INJECTION INTRAVENOUS at 07:35

## 2024-02-15 ENCOUNTER — TELEPHONE (OUTPATIENT)
Dept: HEMATOLOGY ONCOLOGY | Facility: CLINIC | Age: 54
End: 2024-02-15

## 2024-02-15 DIAGNOSIS — R94.8 ABNORMAL RADIONUCLIDE BONE SCAN: Primary | ICD-10-CM

## 2024-02-15 DIAGNOSIS — C50.812 MALIGNANT NEOPLASM OF OVERLAPPING SITES OF BOTH BREASTS IN FEMALE, ESTROGEN RECEPTOR POSITIVE: ICD-10-CM

## 2024-02-15 DIAGNOSIS — C50.811 MALIGNANT NEOPLASM OF OVERLAPPING SITES OF BOTH BREASTS IN FEMALE, ESTROGEN RECEPTOR POSITIVE: ICD-10-CM

## 2024-02-15 DIAGNOSIS — Z17.0 MALIGNANT NEOPLASM OF OVERLAPPING SITES OF BOTH BREASTS IN FEMALE, ESTROGEN RECEPTOR POSITIVE: ICD-10-CM

## 2024-02-15 NOTE — TELEPHONE ENCOUNTER
Spoke with patient about the results. She denies any recent injury to ribs or coughing. Will proceed with PET/CT. She is off Mondays but is open to other days too; can make it work. Will go to  or [facility], whichever is soonest appt.

## 2024-02-15 NOTE — TELEPHONE ENCOUNTER
SPOKE WITH PT REGARDING APPT BEING R/S PT REQUEST PHONE CALL TO DISCUSS RESULTS INFORMED PT I WILL HAVE SOMEONE FROM THE TEAM CONTACT HER, PT VERBALIZED UNDERSTANDING.

## 2024-02-22 ENCOUNTER — TELEPHONE (OUTPATIENT)
Dept: PALLIATIVE MEDICINE | Facility: CLINIC | Age: 54
End: 2024-02-22

## 2024-02-27 DIAGNOSIS — G89.3 CANCER RELATED PAIN: ICD-10-CM

## 2024-02-27 DIAGNOSIS — Z17.0 MALIGNANT NEOPLASM OF OVERLAPPING SITES OF BOTH BREASTS IN FEMALE, ESTROGEN RECEPTOR POSITIVE: ICD-10-CM

## 2024-02-27 DIAGNOSIS — F32.A DEPRESSION: ICD-10-CM

## 2024-02-27 DIAGNOSIS — C50.812 MALIGNANT NEOPLASM OF OVERLAPPING SITES OF BOTH BREASTS IN FEMALE, ESTROGEN RECEPTOR POSITIVE: ICD-10-CM

## 2024-02-27 DIAGNOSIS — G47.00 INSOMNIA: ICD-10-CM

## 2024-02-27 DIAGNOSIS — G89.28 CHRONIC POST-OPERATIVE PAIN: ICD-10-CM

## 2024-02-27 DIAGNOSIS — F41.9 ANXIOUSNESS: ICD-10-CM

## 2024-02-27 DIAGNOSIS — Z51.5 PALLIATIVE CARE PATIENT: ICD-10-CM

## 2024-02-27 DIAGNOSIS — C50.811 MALIGNANT NEOPLASM OF OVERLAPPING SITES OF BOTH BREASTS IN FEMALE, ESTROGEN RECEPTOR POSITIVE: ICD-10-CM

## 2024-02-27 RX ORDER — GABAPENTIN 300 MG/1
CAPSULE ORAL
Qty: 90 CAPSULE | Refills: 1 | Status: SHIPPED | OUTPATIENT
Start: 2024-02-27

## 2024-03-04 ENCOUNTER — HOSPITAL ENCOUNTER (OUTPATIENT)
Dept: NUCLEAR MEDICINE | Facility: HOSPITAL | Age: 54
Discharge: HOME | End: 2024-03-04
Payer: COMMERCIAL

## 2024-03-04 DIAGNOSIS — Z17.0 MALIGNANT NEOPLASM OF OVERLAPPING SITES OF BOTH BREASTS IN FEMALE, ESTROGEN RECEPTOR POSITIVE: ICD-10-CM

## 2024-03-04 DIAGNOSIS — C50.811 MALIGNANT NEOPLASM OF OVERLAPPING SITES OF BOTH BREASTS IN FEMALE, ESTROGEN RECEPTOR POSITIVE: ICD-10-CM

## 2024-03-04 DIAGNOSIS — R94.8 ABNORMAL RADIONUCLIDE BONE SCAN: ICD-10-CM

## 2024-03-04 DIAGNOSIS — C50.812 MALIGNANT NEOPLASM OF OVERLAPPING SITES OF BOTH BREASTS IN FEMALE, ESTROGEN RECEPTOR POSITIVE: ICD-10-CM

## 2024-03-04 LAB — GLUCOSE SERPL-MCNC: 84 MG/DL (ref 65–140)

## 2024-03-04 PROCEDURE — 82948 REAGENT STRIP/BLOOD GLUCOSE: CPT

## 2024-03-04 PROCEDURE — G1004 CDSM NDSC: HCPCS

## 2024-03-04 PROCEDURE — A9552 F18 FDG: HCPCS

## 2024-03-04 PROCEDURE — 78815 PET IMAGE W/CT SKULL-THIGH: CPT

## 2024-03-05 ENCOUNTER — TELEPHONE (OUTPATIENT)
Dept: HEMATOLOGY ONCOLOGY | Facility: CLINIC | Age: 54
End: 2024-03-05

## 2024-03-05 NOTE — TELEPHONE ENCOUNTER
Spoke with patient regarding scheduling appointment to discuss PET scan pt verbalized understanding to come in on Thursday, 3/7 at 1340.

## 2024-03-07 ENCOUNTER — OFFICE VISIT (OUTPATIENT)
Dept: HEMATOLOGY ONCOLOGY | Facility: CLINIC | Age: 54
End: 2024-03-07
Payer: COMMERCIAL

## 2024-03-07 VITALS
HEART RATE: 89 BPM | WEIGHT: 203 LBS | BODY MASS INDEX: 31.86 KG/M2 | OXYGEN SATURATION: 96 % | HEIGHT: 67 IN | DIASTOLIC BLOOD PRESSURE: 80 MMHG | RESPIRATION RATE: 17 BRPM | TEMPERATURE: 98.4 F | SYSTOLIC BLOOD PRESSURE: 132 MMHG

## 2024-03-07 DIAGNOSIS — C50.811 MALIGNANT NEOPLASM OF OVERLAPPING SITES OF BOTH BREASTS IN FEMALE, ESTROGEN RECEPTOR POSITIVE: Primary | ICD-10-CM

## 2024-03-07 DIAGNOSIS — C79.51 METASTASIS TO BONE (HCC): ICD-10-CM

## 2024-03-07 DIAGNOSIS — Z17.0 MALIGNANT NEOPLASM OF OVERLAPPING SITES OF BOTH BREASTS IN FEMALE, ESTROGEN RECEPTOR POSITIVE: Primary | ICD-10-CM

## 2024-03-07 DIAGNOSIS — C50.812 MALIGNANT NEOPLASM OF OVERLAPPING SITES OF BOTH BREASTS IN FEMALE, ESTROGEN RECEPTOR POSITIVE: Primary | ICD-10-CM

## 2024-03-07 DIAGNOSIS — R94.8 ABNORMAL RADIONUCLIDE BONE SCAN: ICD-10-CM

## 2024-03-07 PROCEDURE — 99215 OFFICE O/P EST HI 40 MIN: CPT | Performed by: INTERNAL MEDICINE

## 2024-03-07 NOTE — PROGRESS NOTES
HPI: Continuation of care for bilateral breast cancers and inconclusive abnormal findings on PET CT scan.  Patient is here with her friend.  Patient had bilateral mastectomies for bilateral breast cancers in July 2020.   She states genetic testing was negative.   She states she had invasive ductal carcinoma that was larger on the right side than left side.  She states stage was 3 and lymph nodes were positive in right axilla but she does not know how many.  After surgery she had radiation and she thinks that was given to the right side and she had that from October 2022- December 2020.  Since that time she has been on tamoxifen 20 mg daily.  She states she did not receive tamoxifen when she was getting radiation.  Patient stopped having menstrual periods right after surgery even before she started tamoxifen.  No menstrual periods in last 3 years.  She could be switched to aromatase inhibitor but she states her doctor in [facility] wanted her to take tamoxifen for 5 years and then letrozole for 5 years.  Patient's most recent bone scan and MRI scan of brain and right scapula  raised the possibility of may be bony metastatic lesion.  I discussed with interventional radiologist and he reviewed the films and felt scapular lesion was too small to biopsy and he was not impressed with lesion in the calvarium.  He felt these lesions may not have to be metastatic.  I discussed this with the patient and plan was to repeat the scans.  Tumor marker CA 27-29 was within normal range.  Patient he is leaning towards changing from tamoxifen to AI now.  She will have blood work to make sure she is postmenopausal.  Even though her last menstrual period was 5 years ago FSH favors  premenopausal status.  Patient had PET CT scan and that showed abnormalities in the upper neck lymph nodes and anterior left seventh rib.  See report below.  Inconclusive for metastatic disease or not.  We discussed again and patient will follow-up  x-ray ribs and bone scan and if needed in future PET scan.  She was comfortable with this approach.  She will stay on tamoxifen for now.   Patient was advised to follow-up with a gynecologist because of tamoxifen and even otherwise.    Since surgery she has some pain in the mastectomy scars and scars were revised twice.  She states gabapentin is helping and also she takes a CBD and has medical marijuana card.  She had some pain in the lower ribs but that has subsided. She gives history of depression and has been on Effexor ER 75.  Has  history of hypertension and seasonal allergies.  Menarche at 14.  She has 1 son and that was born when she was 35 and prior to that she had 2 miscarriages and had fertility treatments.  LMP 5 years ago at age 48.  She quit smoking cigarettes in  after smoking 1/3 pack of cigarette a day for 20 years.  No alcohol.  Sister has history of skin cancer.  Maternal grandfather and paternal grandmother had lung cancers and they smoked cigarettes.  Patient is allergic to latex and penicillin.    ROS:  Reviewed 12 systems: See symptoms in HPI  Presently no other neurological, cardiac, pulmonary, GI and  symptoms other than listed in HPI.  Other symptoms are in HPI.  No symptoms like fever, chills, bone pains, bleeding, skin rash, weight loss, night sweats, arthritic symptoms,  tiredness , weakness, numbness, claudication and gait problem. No frequent infections.  Not unusually sensitive to heat or cold. No swelling of the ankles. No swollen glands.  Patient is anxious.       Historical Information   Past Medical History:   Diagnosis Date   • Allergies    • Depression 2023   • Malignant neoplasm of overlapping sites of both breasts in female, estrogen receptor positive  2023   • Primary hypertension 2023     Past Surgical History:   Procedure Laterality Date   •  SECTION     • MASTECTOMY Bilateral      Social History   Social History     Substance and Sexual  "Activity   Alcohol Use Not Currently     Social History     Substance and Sexual Activity   Drug Use Yes    Comment: CBD     Social History     Tobacco Use   Smoking Status Former   • Current packs/day: 0.00   • Types: Cigarettes   • Start date:    • Quit date:    • Years since quittin.2   Smokeless Tobacco Never   Tobacco Comments    quit smoking cigarettes in  after smoking 1/3 pack of cigarette a day for 20 years     Family History:   Family History   Problem Relation Age of Onset   • Pulmonary embolism Mother 57   • Diabetes Mother    • Heart attack Father 60   • Diabetes Father    • Skin cancer Sister    • Lung cancer Maternal Grandfather    • Lung cancer Paternal Grandmother          Current Outpatient Medications:   •  cetirizine (ZyrTEC) 10 mg tablet, Take 10 mg by mouth daily, Disp: , Rfl:   •  gabapentin (NEURONTIN) 300 mg capsule, Take 1 capsule by mouth in the morning and, take 2 capsules by mouth at bedtime., Disp: 90 capsule, Rfl: 1  •  lisinopril (ZESTRIL) 20 mg tablet, Take 1 tablet (20 mg total) by mouth daily, Disp: 90 tablet, Rfl: 1  •  tamoxifen (NOLVADEX) 20 mg tablet, Take 20 mg by mouth daily at bedtime, Disp: , Rfl:   •  venlafaxine (EFFEXOR-XR) 75 mg 24 hr capsule, Take 1 capsule (75 mg total) by mouth every evening, Disp: 30 capsule, Rfl: 5    Allergies   Allergen Reactions   • Peanut-Containing Drug Products - Food Allergy Anaphylaxis and Throat Swelling   • Latex Other (See Comments)     Per 23 Oncology note   • Penicillins Other (See Comments)     Per 23 Oncology note       Physical Exam:   Vitals:    24 1334   BP: 132/80   BP Location: Left arm   Patient Position: Sitting   Cuff Size: Adult   Pulse: 89   Resp: 17   Temp: 98.4 °F (36.9 °C)   SpO2: 96%   Weight: 92.1 kg (203 lb)   Height: 5' 7\" (1.702 m)   Patient is alert and oriented.  Patient is not in distress.  Vital signs are above.  There is no icterus.  There is no oral thrush.  There is no " palpable neck mass.  Lung fields are clear to percussion and auscultation.  Heart rate is regular.  No palpable abdominal mass.  Soft and nontender abdomen.  There is no ascites.  There is no edema of the ankles.  There is no calf tenderness.  There is no focal neurological deficit, no skin rash, no palpable lymphadenopathy in the neck and axillary areas,  no clubbing.   Patient is anxious.  Performance status 0.   No lymphedema.      Lab Results: Oncotype score was 15  LABS:      11/28/23  7:32 AM    FSH  See Comment mIU/mL 8.1         Component  Ref Range & Units 11/28/23  7:32 AM   Estradiol  See Comment pg/mL 17.4        Results for orders placed or performed during the hospital encounter of 03/04/24   Fingerstick Glucose (POCT)   Result Value Ref Range    POC Glucose 84 65 - 140 mg/dl     Comprehensive metabolic panel  Status: Final result      Contains abnormal data Comprehensive metabolic panel  Order: [id number]  Status: Final result       Visible to patient: Yes (seen)       Next appt: 03/27/2024 at 08:30 AM in Family Medicine (Nati Garcia DO)       Dx: Malignant neoplasm of overlapping sit...    0 Result Notes       Component  Ref Range & Units 11/22/23  7:00 AM 7/19/23 12:58 PM   Sodium  135 - 147 mmol/L 138 138   Potassium  3.5 - 5.3 mmol/L 4.2 4.0   Chloride  96 - 108 mmol/L 104 103   CO2  21 - 32 mmol/L 29 30   ANION GAP  mmol/L 5 5   BUN  5 - 25 mg/dL 13 13   Creatinine  0.60 - 1.30 mg/dL 0.64 0.64 CM   Comment: Standardized to IDMS reference method   Glucose, Fasting  65 - 99 mg/dL 100 High     Calcium  8.4 - 10.2 mg/dL 9.1 9.3   AST  13 - 39 U/L 14 17   ALT  7 - 52 U/L 11 16 CM   Comment: Specimen collection should occur prior to Sulfasalazine administration due to the potential for falsely depressed results.   Alkaline Phosphatase  34 - 104 U/L 69 67   Total Protein  6.4 - 8.4 g/dL 7.2 7.3   Albumin  3.5 - 5.0 g/dL 4.2 4.4   Total Bilirubin  0.20 - 1.00 mg/dL 0.37 0.31 CM   Comment: Use of this  assay is not recommended for patients undergoing treatment with eltrombopag due to the potential for falsely elevated results.  N-acetyl-p-benzoquinone imine (metabolite of Acetaminophen) will generate erroneously low results in samples for patients that have taken an overdose of Acetaminophen.   eGFR  ml/min/1.73sq m 102 102                     Ref Range & Units 11/22/23  7:00 AM 7/19/23 12:58 PM   CA 27-29  0.0 - 38.6 U/mL 17.6 14.0 CM   Comment: [employer] Immunochemiluminometric Methodology (ICMA)  Values obtained with different assay methods or kits cannot be used     Comprehensive metabolic panel  Status: Final result      Contains abnormal data Comprehensive metabolic panel  Order: [id number]  Status: Final result       Visible to patient: Yes (seen)       Next appt: 03/27/2024 at 08:30 AM in Family Medicine (Nati Garcia DO)       Dx: Malignant neoplasm of overlapping sit...    0 Result Notes       Component  Ref Range & Units 11/22/23  7:00 AM 7/19/23 12:58 PM   Sodium  135 - 147 mmol/L 138 138   Potassium  3.5 - 5.3 mmol/L 4.2 4.0   Chloride  96 - 108 mmol/L 104 103   CO2  21 - 32 mmol/L 29 30   ANION GAP  mmol/L 5 5   BUN  5 - 25 mg/dL 13 13   Creatinine  0.60 - 1.30 mg/dL 0.64 0.64 CM   Comment: Standardized to IDMS reference method   Glucose, Fasting  65 - 99 mg/dL 100 High     Calcium  8.4 - 10.2 mg/dL 9.1 9.3   AST  13 - 39 U/L 14 17   ALT  7 - 52 U/L 11 16 CM   Comment: Specimen collection should occur prior to Sulfasalazine administration due to the potential for falsely depressed results.   Alkaline Phosphatase  34 - 104 U/L 69 67   Total Protein  6.4 - 8.4 g/dL 7.2 7.3   Albumin  3.5 - 5.0 g/dL 4.2 4.4   Total Bilirubin  0.20 - 1.00 mg/dL 0.37 0.31 CM   Comment: Use of this assay is not recommended for patients undergoing treatment with eltrombopag due to the potential for falsely elevated results.  N-acetyl-p-benzoquinone imine (metabolite of Acetaminophen) will generate erroneously low  results in samples for patients that have taken an overdose of Acetaminophen.   eGFR  ml/min/1.73sq m 102 102                 CBC and differential  Status: Final result      Contains abnormal data CBC and differential  Order: [id number]  Status: Final result       Visible to patient: Yes (seen)       Next appt: 03/27/2024 at 08:30 AM in [facility] (Nati GarciaDO)       Dx: Malignant neoplasm of overlapping sit...    0 Result Notes       Component  Ref Range & Units 11/22/23  7:00 AM 7/19/23 12:58 PM   WBC  4.31 - 10.16 Thousand/uL 6.39 10.21 High    RBC  3.81 - 5.12 Million/uL 4.63 4.41   Hemoglobin  11.5 - 15.4 g/dL 13.3 13.3   Hematocrit  34.8 - 46.1 % 42.5 40.6   MCV  82 - 98 fL 92 92   MCH  26.8 - 34.3 pg 28.7 30.2   MCHC  31.4 - 37.4 g/dL 31.3 Low  32.8   RDW  11.6 - 15.1 % 12.8 12.5   MPV  8.9 - 12.7 fL 8.5 Low  8.5 Low    Platelets  149 - 390 Thousands/uL 312 317   nRBC  /100 WBCs 0 0   Neutrophils Relative  43 - 75 % 60 62   Immat GRANS %  0 - 2 % 0 0   Lymphocytes Relative  14 - 44 % 27 26   Monocytes Relative  4 - 12 % 10 9   Eosinophils Relative  0 - 6 % 2 2   Basophils Relative  0 - 1 % 1 1   Neutrophils Absolute  1.85 - 7.62 Thousands/µL 3.83 6.50   Immature Grans Absolute  0.00 - 0.20 Thousand/uL 0.02 0.03   Lymphocytes Absolute  0.60 - 4.47 Thousands/µL 1.72 2.60   Monocytes Absolute  0.17 - 1.22 Thousand/µL 0.65 0.88   Eosinophils Absolute  0.00 - 0.61 Thousand/µL 0.13 0.15   Basophils Absolute  0.00 - 0.10 Thousands/µL 0.04               Imaging Studies:   MPRESSION:     Indeterminate foci of tracer activity involving the right scapula and calvarium, see above for differential diagnosis which includes osseous metastatic disease. There is no cross-sectional comparison imaging to correlate with these indeterminate   findings.     Consider further evaluation with MRI (PET/CT as clinically indicated) as an initial first step for further characterization as clinically indicated.     The study was  marked in EPIC for significant notification.        Workstation performed: [id number]        Imaging    NM bone scan whole body (Order: [id number]) - 10/24/2023    MPRESSION:  No metastatic disease evident. The focus of abnormal tracer activity of the calvarium on the 10/24/2023 examination is quite small so difficult to exclude metastasis based upon this examination.     Minimal altered signal involving white matter discussed above is nonspecific regarding etiology; findings most often relating to chronic small vessel white matter ischemic disease.     Evidence of mild symmetric bilateral proptosis without other orbital abnormality evident; a finding often associated with thyroid orbitopathy.     Incidental benign osteoma right parietal calvarium.     Jamie Ingram M.D., FACR  Senior Member, American Society of Ne[employer]oradiology     Workstation performed: [id number]        Imaging    MRI brain w wo contrast (Order: [id number]) - 11/2/2023    Small subcentimeter bone lesion within the scapula exhibiting post gadolinium enhancement that appears to correspond to a focus of increased radiotracer uptake on recent bone scan study. Metastatic disease is of concern in a patient with history of   breast carcinoma. No additional bone lesions identified.              Workstation performed: [id number]        Imaging    MRI shoulder right w wo contrast (Order: [id number]) - 11/2/2023    MPRESSION:     Mild diffuse hepatic steatosis. 1.2 cm nonobstructing calcification in the midpole of the right kidney. No hydronephrosis bilaterally. No sonographic evidence for cholelithiasis, acute cholecystitis, or significant biliary ductal dilatation.           Workstation performed: [id number]        Imaging    US abdomen complete (Order: [id number]) - 10/24/2023  NM PET CT skull base to mid thigh  Status: Final result     PACS Images     Show images for NM PET CT skull base to mid thigh    NM PET CT skull base to mid thigh: Result Notes      Gosia Gomez PA-C  3/5/2024  2:35 PM EST       Seeing [place] on 3/7 to review.            Study Result    Result Text   PET/CT SCAN     INDICATION: R94.8: Abnormal results of function studies of other organs and systems  C50.811: Malignant neoplasm of overlapping sites of right female breast  C50.812: Malignant neoplasm of overlapping sites of left female breast  Z17.0: Estrogen receptor positive status (ER+)     MODIFIER: PS     COMPARISON: Bone scan dated 2/6/2024, MRI of the right shoulder dated 2/6/2024, and MRI of the brain dated 2/9/2024     CELL TYPE:  bx 7/13/20 b/l breast (mastectomy) (+) ductal carcinoma in-situ     TECHNIQUE:   10.5 mCi F-18-FDG administered IV. Multiplanar attenuation corrected and non attenuation corrected PET images are available for interpretation, and contiguous, low dose, axial CT sections were obtained from the skull base through the femurs.   Intravenous contrast material was not utilized. This examination, like all CT scans performed in the [facility], was performed utilizing techniques to minimize radiation dose exposure, including the use of iterative reconstruction and   automated exposure control.     Fasting serum glucose: 84 mg/dl     FINDINGS:     VISUALIZED BRAIN:  No acute abnormalities are seen.     HEAD/NECK:  Essentially symmetric intense FDG activity involving the bilateral tonsillar regions which given symmetry possibly reflects physiologic/inflammatory activity.     Small nonspecific mildly FDG avid bilateral upper cervical lymph nodes; for example, image 40 of series 4 there is a right level 2 lymph node measuring 7 mm in short axis with max SUV of 2.8.  CT images: Unremarkable.     CHEST:  No FDG avid soft tissue lesions are seen.  CT images: Bilateral mastectomy and postsurgical changes in the axillary regions. Atherosclerotic vascular calcifications are noted. Likely emphysematous changes with nonspecific mosaic attenuation  involving the lungs.     ABDOMEN:  No FDG avid soft tissue lesions are seen.  CT images: Probable hepatic steatosis. Nonobstructing right renal calculus. Atherosclerotic vascular calcifications are noted. Small hiatal hernia. Diverticulosis coli.     PELVIS:  Nonspecific patchy FDG activity involving the vagina towards the perineum without definitive correlate on low-dose unenhanced CT, possibly reflecting urine activity; findings can be further characterized with direct visualization as clinically indicated.  CT images: Heterogeneous lobulated appearance to the uterus, possibly reflecting myomatous uterus but poorly characterized on low-dose unenhanced CT.     OSSEOUS STRUCTURES:  Best seen on image 104 of series 4 is subtle focal FDG activity involving the anterior left seventh rib where focal sclerosis and subtle contour deformity is noted with max SUV of 3.1 corresponding to new activity seen on bone scan dated 2/6/2024 and   most suggestive of rib fracture with hypermetabolic osseous metastatic disease versus pathologic fracture not excluded. Correlation with fracture history is recommended.     There is no definite focal FDG activity about the lateral right scapula in region of indeterminate activity seen on prior bone scan. On image 76 of series 4 there appears to be subtle asymmetric sclerosis measuring approximately 5 mm involving the   lateral aspect of the right scapula without definitive associated FDG activity.. Findings on prior bone scan remain nonspecific with differential diagnosis including benign and malignant etiologies.     Subtle activity noted involving the posterior midline of the calvarium seen on bone scan is not definitively imaged on the current exam as the calvarium is image superiorly to the level of just above the frontal sinuses. There is no definite focal FDG   activity involving the visualized portions of the calvarium. Bone scan finding involving the posterior calvarium remains  nonspecific with differential diagnosis including benign and malignant etiologies.  CT images: Degenerative changes are noted involving the spine.     IMPRESSION:     No focal FDG activity definitive for hypermetabolic malignancy/metastatic disease.     -However, note is made of subcentimeter, nonpathologic in size nonspecific mildly FDG avid bilateral upper cervical lymph nodes, possibly inflammatory/physiologic. Attention to this region on short interval follow-up imaging is recommended to exclude   developing cervical lymphadenopathy.     -Additional note is made of focal FDG activity involving the anterior left seventh rib in the region of new activity seen on recent bone scan which has the appearance of fracture deformity with hypermetabolic osseous metastatic disease/pathologic   fracture considered less likely. Correlation with fracture history is recommended.     -No focal FDG activity involving the lateral right scapula in region of indeterminate activity seen on bone scan. There is nonspecific subtle non-FDG avid sclerosis involving the lateral right scapula for which differential diagnosis includes benign as   well as malignant etiologies.     -No focal FDG activity involving the calvarium. It is unclear whether or not the focus of activity involving the posterior midline of the calvarium seen on bone scan is covered on current PET/CT.     Please see above for details and additional findings.     The study was marked in EPIC for significant notification.           Workstation performed: [id number]        Imaging    NM PET CT skull base to mid thigh   Pathology, and Other Studies: No reported other than Oncotype score was 15      Assessment and Plan:  See diagnoses, orders instructions below  Continuation of care for bilateral breast cancers and inconclusive abnormal findings on PET CT scan.  Patient is here with her friend.  Patient had bilateral mastectomies for bilateral breast cancers in July 2020.    She states genetic testing was negative.   She states she had invasive ductal carcinoma that was larger on the right side than left side.  She states stage was 3 and lymph nodes were positive in right axilla but she does not know how many.  After surgery she had radiation and she thinks that was given to the right side and she had that from October 2022- December 2020.  Since that time she has been on tamoxifen 20 mg daily.  She states she did not receive tamoxifen when she was getting radiation.  Patient stopped having menstrual periods right after surgery even before she started tamoxifen.  No menstrual periods in last 3 years.  She could be switched to aromatase inhibitor but she states her doctor in [facility] wanted her to take tamoxifen for 5 years and then letrozole for 5 years.  Patient's most recent bone scan and MRI scan of brain and right scapula  raised the possibility of may be bony metastatic lesion.  I discussed with interventional radiologist and he reviewed the films and felt scapular lesion was too small to biopsy and he was not impressed with lesion in the calvarium.  He felt these lesions may not have to be metastatic.  I discussed this with the patient and plan was to repeat the scans.  Tumor marker CA 27-29 was within normal range.  Patient he is leaning towards changing from tamoxifen to AI now.  She will have blood work to make sure she is postmenopausal.  Even though her last menstrual period was 5 years ago FSH favors  premenopausal status.  Patient had PET CT scan and that showed abnormalities in the upper neck lymph nodes and anterior left seventh rib.  See report below.  Inconclusive for metastatic disease or not.  We discussed again and patient will follow-up x-ray ribs and bone scan and if needed in future PET scan.  She was comfortable with this approach.  She will stay on tamoxifen for now.   Patient was advised to follow-up with a gynecologist because of tamoxifen and even  otherwise.    Since surgery she has some pain in the mastectomy scars and scars were revised twice.  She states gabapentin is helping and also she takes a CBD and has medical marijuana card.  She had some pain in the lower ribs but that has subsided. She gives history of depression and has been on Effexor ER 75.  Has  history of hypertension and seasonal allergies.  Menarche at 14.  She has 1 son and that was born when she was 35 and prior to that she had 2 miscarriages and had fertility treatments.  LMP 5 years ago at age 48.  She quit smoking cigarettes in 2005 after smoking 1/3 pack of cigarette a day for 20 years.  No alcohol.  Sister has history of skin cancer.  Maternal grandfather and paternal grandmother had lung cancers and they smoked cigarettes.  Patient is allergic to latex and penicillin.    Physical examination as recorded and discussed.  Discussed above with patient in detail especially findings on PET CT scan.  She will have follow-up bone scan and rib x-rays.  We had a long discussion.    See diagnoses, orders and instructions below.    Discussed healthy diet and activities as tolerated and health screening test including GYN examination and colonoscopy.  Patient is capable of self-care.  Goal is cure from breast cancers.  She states her genetic test was negative.  All discussed in detail.  Questions answered.    1. Malignant neoplasm of overlapping sites of both breasts in female, estrogen receptor positive     - CBC and differential; Future  - Comprehensive metabolic panel; Future  - Cancer antigen 27.29; Future  - XR ribs left w pa chest min 3 views; Future  - NM bone scan whole body; Future    2. Abnormal radionuclide bone scan (correction: PET scan)    - XR ribs left w pa chest min 3 views; Future  - NM bone scan whole body; Future    3. Metastasis to bone (HCC)?    - XR ribs left w pa chest min 3 views; Future  - NM bone scan whole body; Future      X-ray rib cage and bone scan in 2 months.   Blood work before next visit in 3 months.           Patient will continue to follow with  primary physician and other consultants.  Patient voiced understanding and agrees      Disclaimer: This document was prepared using a dictation device. If a word or phrase is confusing, or does not make sense, this is likely due to recognition error which was not discovered during the providers review. If you believe an error has occurred, please Contact me through [facility] Line service for ryley?cation.    Counseling / Coordination of Care  ..  Provided counseling and support

## 2024-04-03 ENCOUNTER — TELEPHONE (OUTPATIENT)
Dept: FAMILY MEDICINE CLINIC | Facility: CLINIC | Age: 54
End: 2024-04-03

## 2024-04-05 ENCOUNTER — TELEPHONE (OUTPATIENT)
Dept: HEMATOLOGY ONCOLOGY | Facility: CLINIC | Age: 54
End: 2024-04-05

## 2024-04-05 NOTE — TELEPHONE ENCOUNTER
Appointment Change  Cancel, Reschedule, Change to Virtual      Who are you speaking with? Patient   If it is not the patient, is the caller listed on the communication consent form? Yes   Which provider is the appointment scheduled with? Dr. Boyle   When was the original appointment scheduled?    Please list date and time 6/10/24   At which location is the appointment scheduled to take place? [place]   Was the appointment rescheduled?     Was the appointment changed from an in person visit to a virtual visit?    If so, please list the details of the change. 6/14/24   What is the reason for the appointment change? provider

## 2024-04-27 DIAGNOSIS — I10 PRIMARY HYPERTENSION: ICD-10-CM

## 2024-04-27 DIAGNOSIS — Z51.5 PALLIATIVE CARE PATIENT: ICD-10-CM

## 2024-04-27 DIAGNOSIS — F41.9 ANXIOUSNESS: ICD-10-CM

## 2024-04-27 DIAGNOSIS — G47.00 INSOMNIA: ICD-10-CM

## 2024-04-27 DIAGNOSIS — G89.3 CANCER RELATED PAIN: ICD-10-CM

## 2024-04-27 DIAGNOSIS — C50.811 MALIGNANT NEOPLASM OF OVERLAPPING SITES OF BOTH BREASTS IN FEMALE, ESTROGEN RECEPTOR POSITIVE (HCC): ICD-10-CM

## 2024-04-27 DIAGNOSIS — G89.28 CHRONIC POST-OPERATIVE PAIN: ICD-10-CM

## 2024-04-27 DIAGNOSIS — Z17.0 MALIGNANT NEOPLASM OF OVERLAPPING SITES OF BOTH BREASTS IN FEMALE, ESTROGEN RECEPTOR POSITIVE (HCC): ICD-10-CM

## 2024-04-27 DIAGNOSIS — C50.812 MALIGNANT NEOPLASM OF OVERLAPPING SITES OF BOTH BREASTS IN FEMALE, ESTROGEN RECEPTOR POSITIVE (HCC): ICD-10-CM

## 2024-04-27 DIAGNOSIS — F32.A DEPRESSION: ICD-10-CM

## 2024-04-28 RX ORDER — LISINOPRIL 20 MG/1
20 TABLET ORAL DAILY
Qty: 90 TABLET | Refills: 1 | Status: SHIPPED | OUTPATIENT
Start: 2024-04-28

## 2024-04-28 RX ORDER — GABAPENTIN 300 MG/1
CAPSULE ORAL
Qty: 90 CAPSULE | Refills: 0 | Status: SHIPPED | OUTPATIENT
Start: 2024-04-28 | End: 2024-04-29 | Stop reason: SDUPTHER

## 2024-04-28 NOTE — TELEPHONE ENCOUNTER
Patient needs a follow-up scheduled if she would like to continue receiving prescriptions; any time in the next 3 months is fine. Thanks.

## 2024-04-29 ENCOUNTER — TELEPHONE (OUTPATIENT)
Dept: HEMATOLOGY ONCOLOGY | Facility: CLINIC | Age: 54
End: 2024-04-29

## 2024-04-29 ENCOUNTER — TELEPHONE (OUTPATIENT)
Dept: PALLIATIVE MEDICINE | Facility: CLINIC | Age: 54
End: 2024-04-29

## 2024-04-29 DIAGNOSIS — G89.3 CANCER RELATED PAIN: ICD-10-CM

## 2024-04-29 DIAGNOSIS — G47.00 INSOMNIA: ICD-10-CM

## 2024-04-29 DIAGNOSIS — F41.9 ANXIOUSNESS: ICD-10-CM

## 2024-04-29 DIAGNOSIS — Z51.5 PALLIATIVE CARE PATIENT: ICD-10-CM

## 2024-04-29 DIAGNOSIS — Z17.0 MALIGNANT NEOPLASM OF OVERLAPPING SITES OF BOTH BREASTS IN FEMALE, ESTROGEN RECEPTOR POSITIVE (HCC): ICD-10-CM

## 2024-04-29 DIAGNOSIS — F32.A DEPRESSION: ICD-10-CM

## 2024-04-29 DIAGNOSIS — G89.28 CHRONIC POST-OPERATIVE PAIN: ICD-10-CM

## 2024-04-29 DIAGNOSIS — C50.812 MALIGNANT NEOPLASM OF OVERLAPPING SITES OF BOTH BREASTS IN FEMALE, ESTROGEN RECEPTOR POSITIVE (HCC): ICD-10-CM

## 2024-04-29 DIAGNOSIS — C50.811 MALIGNANT NEOPLASM OF OVERLAPPING SITES OF BOTH BREASTS IN FEMALE, ESTROGEN RECEPTOR POSITIVE (HCC): ICD-10-CM

## 2024-04-29 RX ORDER — GABAPENTIN 300 MG/1
CAPSULE ORAL
Qty: 270 CAPSULE | Refills: 0 | Status: SHIPPED | OUTPATIENT
Start: 2024-04-29 | End: 2024-05-01 | Stop reason: SDUPTHER

## 2024-04-29 NOTE — TELEPHONE ENCOUNTER
Appointment Change  Cancel, Reschedule, Change to Virtual      Who are you speaking with? Patient   If it is not the patient, is the caller listed on the communication consent form? Yes   Which provider is the appointment scheduled with? Dr. Boyle   When was the original appointment scheduled?    Please list date and time 6/14/24   At which location is the appointment scheduled to take place? [place]   Was the appointment rescheduled?     Was the appointment changed from an in person visit to a virtual visit?    If so, please list the details of the change. 6/24/24   What is the reason for the appointment change? Provider/ patient advised this is the 3rd time she is being rs

## 2024-04-29 NOTE — TELEPHONE ENCOUNTER
[facility] is requesting an order for Gabapentin 300 mg cap , 90 day supply   270 count.     Please resubmit order if in agreement.    If not in agreement a call will be placed to pharmacy to notify to keep order as written.     Pill pack 3

## 2024-05-01 ENCOUNTER — OFFICE VISIT (OUTPATIENT)
Dept: PALLIATIVE MEDICINE | Facility: CLINIC | Age: 54
End: 2024-05-01
Payer: COMMERCIAL

## 2024-05-01 VITALS
BODY MASS INDEX: 32.8 KG/M2 | WEIGHT: 209.44 LBS | RESPIRATION RATE: 17 BRPM | OXYGEN SATURATION: 98 % | DIASTOLIC BLOOD PRESSURE: 74 MMHG | SYSTOLIC BLOOD PRESSURE: 134 MMHG | HEART RATE: 107 BPM | TEMPERATURE: 98 F

## 2024-05-01 DIAGNOSIS — G89.28 CHRONIC POST-OPERATIVE PAIN: ICD-10-CM

## 2024-05-01 DIAGNOSIS — C50.811 MALIGNANT NEOPLASM OF OVERLAPPING SITES OF BOTH BREASTS IN FEMALE, ESTROGEN RECEPTOR POSITIVE (HCC): Primary | ICD-10-CM

## 2024-05-01 DIAGNOSIS — F41.9 ANXIOUSNESS: ICD-10-CM

## 2024-05-01 DIAGNOSIS — Z71.89 ADVANCED CARE PLANNING/COUNSELING DISCUSSION: ICD-10-CM

## 2024-05-01 DIAGNOSIS — Z17.0 MALIGNANT NEOPLASM OF OVERLAPPING SITES OF BOTH BREASTS IN FEMALE, ESTROGEN RECEPTOR POSITIVE (HCC): Primary | ICD-10-CM

## 2024-05-01 DIAGNOSIS — C50.812 MALIGNANT NEOPLASM OF OVERLAPPING SITES OF BOTH BREASTS IN FEMALE, ESTROGEN RECEPTOR POSITIVE (HCC): Primary | ICD-10-CM

## 2024-05-01 DIAGNOSIS — Z79.899 MEDICAL MARIJUANA USE: ICD-10-CM

## 2024-05-01 DIAGNOSIS — F32.A DEPRESSION: ICD-10-CM

## 2024-05-01 DIAGNOSIS — G89.3 CANCER RELATED PAIN: ICD-10-CM

## 2024-05-01 DIAGNOSIS — Z51.5 PALLIATIVE CARE PATIENT: ICD-10-CM

## 2024-05-01 DIAGNOSIS — F32.A DEPRESSION, UNSPECIFIED DEPRESSION TYPE: ICD-10-CM

## 2024-05-01 DIAGNOSIS — F41.9 ANXIETY: ICD-10-CM

## 2024-05-01 DIAGNOSIS — G47.00 INSOMNIA: ICD-10-CM

## 2024-05-01 PROCEDURE — 99213 OFFICE O/P EST LOW 20 MIN: CPT | Performed by: INTERNAL MEDICINE

## 2024-05-01 RX ORDER — VENLAFAXINE HYDROCHLORIDE 150 MG/1
150 CAPSULE, EXTENDED RELEASE ORAL EVERY EVENING
Qty: 30 CAPSULE | Refills: 5 | Status: SHIPPED | OUTPATIENT
Start: 2024-05-01

## 2024-05-01 RX ORDER — GABAPENTIN 300 MG/1
CAPSULE ORAL
Qty: 360 CAPSULE | Refills: 0 | Status: SHIPPED | OUTPATIENT
Start: 2024-05-01

## 2024-05-01 NOTE — PROGRESS NOTES
Outpatient Follow-Up - Palliative and Supportive Care   Magui Wren 53 y.o. female [id number]  1. Malignant neoplasm of overlapping sites of both breasts in female, estrogen receptor positive (HCC)  -     gabapentin (NEURONTIN) 300 mg capsule; Take 1 capsule by mouth in the morning and, take 3 capsules by mouth at bedtime.  -     venlafaxine (EFFEXOR-XR) 150 mg 24 hr capsule; Take 1 capsule (150 mg total) by mouth every evening    2. Advanced care planning/counseling discussion    3. Cancer related pain  -     gabapentin (NEURONTIN) 300 mg capsule; Take 1 capsule by mouth in the morning and, take 3 capsules by mouth at bedtime.  -     venlafaxine (EFFEXOR-XR) 150 mg 24 hr capsule; Take 1 capsule (150 mg total) by mouth every evening    4. Palliative care patient  Assessment & Plan:  Time spent with patient providing supportive listening.   All questions and concerns were addressed to their satisfaction.    Decisional apparatus:  Patient is competent on my exam today.  If competence is lost, patient's substitute decision maker would default to  by [place] 169.  Advance Directive / Living Will / POLST:  yes, in chart'  Opioid Agreement: N/A    RTC: 6 months      Orders:  -     gabapentin (NEURONTIN) 300 mg capsule; Take 1 capsule by mouth in the morning and, take 3 capsules by mouth at bedtime.  -     venlafaxine (EFFEXOR-XR) 150 mg 24 hr capsule; Take 1 capsule (150 mg total) by mouth every evening    5. Medical marijuana use    6. Anxiety  -     venlafaxine (EFFEXOR-XR) 150 mg 24 hr capsule; Take 1 capsule (150 mg total) by mouth every evening    7. Depression, unspecified depression type  -     venlafaxine (EFFEXOR-XR) 150 mg 24 hr capsule; Take 1 capsule (150 mg total) by mouth every evening    8. Chronic post-operative pain  -     gabapentin (NEURONTIN) 300 mg capsule; Take 1 capsule by mouth in the morning and, take 3 capsules by mouth at bedtime.  -     venlafaxine (EFFEXOR-XR) 150 mg 24 hr capsule; Take 1  capsule (150 mg total) by mouth every evening    9. Depression  -     gabapentin (NEURONTIN) 300 mg capsule; Take 1 capsule by mouth in the morning and, take 3 capsules by mouth at bedtime.    10. Anxiousness  -     gabapentin (NEURONTIN) 300 mg capsule; Take 1 capsule by mouth in the morning and, take 3 capsules by mouth at bedtime.    11. Insomnia  -     gabapentin (NEURONTIN) 300 mg capsule; Take 1 capsule by mouth in the morning and, take 3 capsules by mouth at bedtime.  -     venlafaxine (EFFEXOR-XR) 150 mg 24 hr capsule; Take 1 capsule (150 mg total) by mouth every evening      Medications Discontinued During This Encounter   Medication Reason    venlafaxine (EFFEXOR-XR) 75 mg 24 hr capsule Reorder    gabapentin (NEURONTIN) 300 mg capsule Reorder             Magui Wren was seen today for symptoms and planning cares related to above illnesses.  I have reviewed the patient's controlled substance dispensing history in the Prescription Drug Monitoring Program in compliance with the [facility] regulations before prescribing any controlled substances.    They are invited to continue to follow with us.  If there are questions or concerns, please contact us through our clinic/answering service 24 hours a day, seven days a week.    Karen Cummings, [facility]iative [facility] Support[facility]  [phone]      Visit Information    Accompanied By: No one    Source of History: Self    History Limitations: None      Narrative and Interval History      Magui Wren is a 53 y.o. female who presents in follow up of symptoms related to breast cancer.  Pertinent issues include: symptom management, depression or anxiety    Patient seen today in follow-up.  Overall she still having some tenderness at the right-sided mastectomy site.  She does okay during the day but after the prolonged day wearing a prosthesis she tends to have more discomfort that inhibits her sleep.  We discussed making alterations to her gabapentin and she  is agreeable.  Also she feels that her mood has improved she said she had a few bad days back in January but feels overall she is doing better.  Nonetheless, we discussed a slight up titration of her Effexor given that she still experiencing some significantly down days and struggling with emptiness syndrome as her son is graduating this month.  She does note that she is happily engaged that she is planning a full wedding.    Past medical, surgical, social, and family histories are reviewed and pertinent updates are made.    Review of Systems   Neurological:  Positive for numbness.   Psychiatric/Behavioral:  Positive for depression.    All other systems reviewed and are negative.        Physical Exam and Objective Data  Vital Signs - /74 (BP Location: Left arm, Patient Position: Sitting, Cuff Size: Standard)   Pulse (!) 107   Temp 98 °F (36.7 °C) (Temporal)   Resp 17   Wt 95 kg (209 lb 7 oz)   SpO2 98%   BMI 32.80 kg/m²   Physical Exam  Vitals and nursing note reviewed.   Constitutional:       General: She is not in acute distress.  HENT:      Head: Normocephalic and atraumatic.      Right Ear: External ear normal.      Left Ear: External ear normal.   Eyes:      General:         Right eye: No discharge.         Left eye: No discharge.   Cardiovascular:      Rate and Rhythm: Regular rhythm. Tachycardia present.   Chest:      Chest wall: Tenderness present.   Abdominal:      General: There is no distension.      Palpations: Abdomen is soft.   Skin:     Coloration: Skin is not jaundiced or pale.   Neurological:      General: No focal deficit present.      Mental Status: She is oriented to person, place, and time.   Psychiatric:         Mood and Affect: Mood normal.         Behavior: Behavior normal.           Radiology and Laboratory:  I personally reviewed and interpreted the following results: PET scan- no definitive metastatic disease    I have spent a total time of 20 minutes on 05/01/24 in caring for  this patient including Risks and benefits of tx options, Instructions for management, Patient and family education, Importance of tx compliance, Risk factor reductions, Impressions, Counseling / Coordination of care, Documenting in the medical record, Reviewing / ordering tests, medicine, procedures  , and Obtaining or reviewing history  .

## 2024-05-01 NOTE — ASSESSMENT & PLAN NOTE
Time spent with patient providing supportive listening.   All questions and concerns were addressed to their satisfaction.    Decisional apparatus:  Patient is competent on my exam today.  If competence is lost, patient's substitute decision maker would default to  by [place] 169.  Advance Directive / Living Will / POLST:  yes, in chart'  Opioid Agreement: N/A    RTC: 6 months

## 2024-05-01 NOTE — ASSESSMENT & PLAN NOTE
>>ASSESSMENT AND PLAN FOR ADVANCED CARE PLANNING/COUNSELING DISCUSSION WRITTEN ON 5/1/2024 10:39 AM BY RADHA FOSTER, DO    ACP on file

## 2024-05-07 ENCOUNTER — HOSPITAL ENCOUNTER (EMERGENCY)
Facility: HOSPITAL | Age: 54
Discharge: HOME | End: 2024-05-07
Attending: EMERGENCY MEDICINE
Payer: COMMERCIAL

## 2024-05-07 ENCOUNTER — APPOINTMENT (EMERGENCY)
Dept: RADIOLOGY | Facility: HOSPITAL | Age: 54
End: 2024-05-07
Payer: COMMERCIAL

## 2024-05-07 VITALS
RESPIRATION RATE: 18 BRPM | SYSTOLIC BLOOD PRESSURE: 139 MMHG | DIASTOLIC BLOOD PRESSURE: 90 MMHG | HEART RATE: 78 BPM | TEMPERATURE: 98.1 F | OXYGEN SATURATION: 97 %

## 2024-05-07 DIAGNOSIS — M25.511 ACUTE PAIN OF RIGHT SHOULDER: Primary | ICD-10-CM

## 2024-05-07 PROCEDURE — 99284 EMERGENCY DEPT VISIT MOD MDM: CPT | Performed by: EMERGENCY MEDICINE

## 2024-05-07 PROCEDURE — 99283 EMERGENCY DEPT VISIT LOW MDM: CPT

## 2024-05-07 PROCEDURE — 96372 THER/PROPH/DIAG INJ SC/IM: CPT

## 2024-05-07 PROCEDURE — 73030 X-RAY EXAM OF SHOULDER: CPT

## 2024-05-07 RX ORDER — KETOROLAC TROMETHAMINE 30 MG/ML
15 INJECTION, SOLUTION INTRAMUSCULAR; INTRAVENOUS ONCE
Status: COMPLETED | OUTPATIENT
Start: 2024-05-07 | End: 2024-05-07

## 2024-05-07 RX ORDER — NAPROXEN 500 MG/1
500 TABLET ORAL 2 TIMES DAILY WITH MEALS
Qty: 30 TABLET | Refills: 0 | Status: SHIPPED | OUTPATIENT
Start: 2024-05-07

## 2024-05-07 RX ADMIN — KETOROLAC TROMETHAMINE 15 MG: 30 INJECTION, SOLUTION INTRAMUSCULAR; INTRAVENOUS at 08:00

## 2024-05-07 NOTE — ED PROVIDER NOTES
History  Chief Complaint   Patient presents with    Shoulder Pain     Pt presents with right shoulder pain x3 weeks, states she had no trauma to area. States she has tried CBD and pain meds with no relief      53 year old female with a history of bilateral breast cancer s/p bilateral mastectomies who presents with 3 weeks of right shoulder pain. She reports an injury to the shoulder approximately 3 weeks ago, and then experienced worsening pain in the shoulder a few days ago while lifting something. She reports some shooting pains down into her elbow at times, but denies any numbness or weakness in the distal extremity. Pain is worse with abduction of the shoulder, and she is unable to full abduct the shoulder.         Prior to Admission Medications   Prescriptions Last Dose Informant Patient Reported? Taking?   cetirizine (ZyrTEC) 10 mg tablet  Self Yes No   Sig: Take 10 mg by mouth daily   gabapentin (NEURONTIN) 300 mg capsule   No No   Sig: Take 1 capsule by mouth in the morning and, take 3 capsules by mouth at bedtime.   lisinopril (ZESTRIL) 20 mg tablet   No No   Sig: Take 1 tablet by mouth daily.   tamoxifen (NOLVADEX) 20 mg tablet  Self Yes No   Sig: Take 20 mg by mouth daily at bedtime   venlafaxine (EFFEXOR-XR) 150 mg 24 hr capsule   No No   Sig: Take 1 capsule (150 mg total) by mouth every evening      Facility-Administered Medications: None       Past Medical History:   Diagnosis Date    Allergies     Depression 2023    Malignant neoplasm of overlapping sites of both breasts in female, estrogen receptor positive (HCC) 2023    Primary hypertension 2023       Past Surgical History:   Procedure Laterality Date     SECTION  2006    MASTECTOMY Bilateral        Family History   Problem Relation Age of Onset    Pulmonary embolism Mother 57    Diabetes Mother     Heart attack Father 60    Diabetes Father     Skin cancer Sister     Lung cancer Maternal Grandfather     Lung cancer Paternal  Grandmother      I have reviewed and agree with the history as documented.    E-Cigarette/Vaping    E-Cigarette Use Never User      E-Cigarette/Vaping Substances    Nicotine No     THC No     CBD No     Flavoring No     Other No     Unknown No      Social History     Tobacco Use    Smoking status: Former     Current packs/day: 0.00     Types: Cigarettes     Start date:      Quit date:      Years since quittin.3    Smokeless tobacco: Never    Tobacco comments:     quit smoking cigarettes in  after smoking 1/3 pack of cigarette a day for 20 years   Vaping Use    Vaping status: Never Used   Substance Use Topics    Alcohol use: Not Currently    Drug use: Yes     Comment: CBD       Review of Systems   Musculoskeletal:  Positive for myalgias.   All other systems reviewed and are negative.      Physical Exam  Physical Exam  Vitals and nursing note reviewed.   Constitutional:       General: She is awake. She is not in acute distress.     Appearance: She is not toxic-appearing.   HENT:      Head: Normocephalic and atraumatic.   Eyes:      General: Vision grossly intact. Gaze aligned appropriately.   Cardiovascular:      Rate and Rhythm: Normal rate and regular rhythm.   Pulmonary:      Effort: Pulmonary effort is normal. No respiratory distress.   Musculoskeletal:      Right shoulder: No swelling, deformity or tenderness. Decreased range of motion (unable to abduct past 90 degrees).      Cervical back: Full passive range of motion without pain and neck supple.   Skin:     General: Skin is warm and dry.   Neurological:      General: No focal deficit present.      Mental Status: She is alert and oriented to person, place, and time.         Vital Signs  ED Triage Vitals   Temperature Pulse Respirations Blood Pressure SpO2   24 0731 24 0729 24 0729 24 0729 24 0729   98.1 °F (36.7 °C) 78 18 139/90 97 %      Temp Source Heart Rate Source Patient Position - Orthostatic VS BP Location  FiO2 (%)   05/07/24 0731 05/07/24 0729 05/07/24 0729 05/07/24 0729 --   Oral Monitor Sitting Left arm       Pain Score       --                  Vitals:    05/07/24 0729   BP: 139/90   Pulse: 78   Patient Position - Orthostatic VS: Sitting         Visual Acuity      ED Medications  Medications - No data to display    Diagnostic Studies  Results Reviewed       None                   No orders to display              Procedures  Procedures         ED Course                                             Medical Decision Making  Amount and/or Complexity of Data Reviewed  Radiology: ordered.    Risk  Prescription drug management.             Disposition  Final diagnoses:   None     ED Disposition       None          Follow-up Information    None         Patient's Medications   Discharge Prescriptions    No medications on file       No discharge procedures on file.    PDMP Review         Value Time User    PDMP Reviewed  Yes 1/15/2024  2:27 PM Gideon Smith MD            ED Provider  Electronically Signed by               Disposition  Final diagnoses:   Acute pain of right shoulder     Time reflects when diagnosis was documented in both MDM as applicable and the Disposition within this note       Time User Action Codes Description Comment    5/7/2024  8:31 AM Diana Eli [M25.511] Acute pain of right shoulder           ED Disposition       ED Disposition   Discharge    Condition   Stable    Date/Time   Tue May 7, 2024  8:31 AM    Comment   Magui Wren discharge to home/self care.                   Follow-up Information       Follow up With Specialties Details Why Contact Info Additional Information    [facility] [place] [facility] Schedule an appointment as soon as possible for a visit   [address]  [address]  [facility] [id number] [phone] [facility] [place], [facility] [address], [address], [place], [address] [phone]    Nati Garcia DO Family Medicine Schedule an appointment as soon as possible for a visit  if symptoms persist [address]  [address]  [facility] [address]  [phone]       [facility] Emergency Department Emergency Medicine Go to  If symptoms worsen [address] [address]  [phone] [facility] Emergency Department, [address] [place], [place], [address]            Discharge Medication List as of 5/7/2024  8:38 AM        START taking these medications    Details   naproxen (Naprosyn) 500 mg tablet Take 1 tablet (500 mg total) by mouth 2 (two) times a day with meals, Starting Tue 5/7/2024, Normal           CONTINUE these medications which have NOT CHANGED    Details   cetirizine (ZyrTEC) 10 mg tablet Take 10 mg by mouth daily, Historical Med      gabapentin (NEURONTIN) 300 mg capsule Take 1 capsule by mouth in the morning and, take 3 capsules by mouth at bedtime., Normal      lisinopril (ZESTRIL) 20 mg tablet Take 1 tablet by  mouth daily., Starting Sun 4/28/2024, Normal      tamoxifen (NOLVADEX) 20 mg tablet Take 20 mg by mouth daily at bedtime, Historical Med      venlafaxine (EFFEXOR-XR) 150 mg 24 hr capsule Take 1 capsule (150 mg total) by mouth every evening, Starting Wed 5/1/2024, Normal                 PDMP Review         Value Time User    PDMP Reviewed  Yes 1/15/2024  2:27 PM Gideon Smith MD            ED Provider  Electronically Signed by             Diana Eli DO  05/22/24 0798

## 2024-05-14 ENCOUNTER — OFFICE VISIT (OUTPATIENT)
Dept: FAMILY MEDICINE CLINIC | Facility: CLINIC | Age: 54
End: 2024-05-14
Payer: COMMERCIAL

## 2024-05-14 VITALS
HEIGHT: 67 IN | HEART RATE: 90 BPM | WEIGHT: 202.8 LBS | TEMPERATURE: 97.9 F | SYSTOLIC BLOOD PRESSURE: 114 MMHG | RESPIRATION RATE: 18 BRPM | DIASTOLIC BLOOD PRESSURE: 76 MMHG | BODY MASS INDEX: 31.83 KG/M2 | OXYGEN SATURATION: 96 %

## 2024-05-14 DIAGNOSIS — Z17.0 MALIGNANT NEOPLASM OF OVERLAPPING SITES OF BOTH BREASTS IN FEMALE, ESTROGEN RECEPTOR POSITIVE (HCC): ICD-10-CM

## 2024-05-14 DIAGNOSIS — C50.812 MALIGNANT NEOPLASM OF OVERLAPPING SITES OF BOTH BREASTS IN FEMALE, ESTROGEN RECEPTOR POSITIVE (HCC): ICD-10-CM

## 2024-05-14 DIAGNOSIS — Z00.00 PHYSICAL EXAM, ANNUAL: Primary | ICD-10-CM

## 2024-05-14 DIAGNOSIS — C50.811 MALIGNANT NEOPLASM OF OVERLAPPING SITES OF BOTH BREASTS IN FEMALE, ESTROGEN RECEPTOR POSITIVE (HCC): ICD-10-CM

## 2024-05-14 DIAGNOSIS — I10 PRIMARY HYPERTENSION: ICD-10-CM

## 2024-05-14 DIAGNOSIS — Z23 ENCOUNTER FOR IMMUNIZATION: ICD-10-CM

## 2024-05-14 PROCEDURE — 90750 HZV VACC RECOMBINANT IM: CPT

## 2024-05-14 PROCEDURE — 90472 IMMUNIZATION ADMIN EACH ADD: CPT

## 2024-05-14 PROCEDURE — 99213 OFFICE O/P EST LOW 20 MIN: CPT | Performed by: FAMILY MEDICINE

## 2024-05-14 PROCEDURE — G0439 PPPS, SUBSEQ VISIT: HCPCS | Performed by: FAMILY MEDICINE

## 2024-05-14 PROCEDURE — 90715 TDAP VACCINE 7 YRS/> IM: CPT

## 2024-05-14 PROCEDURE — 90471 IMMUNIZATION ADMIN: CPT

## 2024-05-14 NOTE — PROGRESS NOTES
Name: Magui Wren      : 1970      MRN: [id number]  Encounter Provider: Nati Garcia DO  Encounter Date: 2024   Encounter department: [facility]    Assessment & Plan     1. Physical exam, annual  Comments:  tdap and shingrix today  return in 3 mo for nurse visit for shingrix/prevnar  otherwise up to date preventively  Orders:  -     Ambulatory Referral to Obstetrics / Gynecology; Future  2. Encounter for immunization  -     TDAP VACCINE GREATER THAN OR EQUAL TO 8YO IM  -     Zoster Vaccine Recombinant IM  3. Malignant neoplasm of overlapping sites of both breasts in female, estrogen receptor positive (HCC)  Comments:  continue f/u with h/o and palliative care  4. Primary hypertension  Comments:  controlled on current meds  continue same  f/u 6 mo  Orders:  -     CBC and differential; Future; Expected date: 2024  -     Comprehensive metabolic panel; Future; Expected date: 2024  -     Lipid panel; Future; Expected date: 2024  -     TSH, 3rd generation; Future; Expected date: 2024         Subjective      HPI  Pt presents for physical exam.  Interviewing for jobs.  Doing well in general.  Due for tdap, shingrix and prevnar.  Exercising.  Seeing dental.  Up to date with gyn  up to date on crc screening.    From a problems standpoint, pt's blood pressure is appropriate.  Tolerating lisinopril.  No chest pain, shortness of breath, n/v, abd pain, visual changes, paresthesias, weakness.    Seeing h/o and palliative care for her breast CA.  Chronic pain being managed by neurontin, medical marijuana.  Mood controlled on effexor.      Review of Systems   Constitutional:  Negative for chills, fatigue, fever and unexpected weight change.   HENT:  Negative for congestion, ear pain, hearing loss, postnasal drip, rhinorrhea, sinus pressure, sinus pain, sore throat, trouble swallowing and voice change.    Eyes:  Negative for pain, redness and visual disturbance.  "  Respiratory:  Negative for cough and shortness of breath.    Cardiovascular:  Negative for chest pain, palpitations and leg swelling.   Gastrointestinal:  Negative for abdominal pain, constipation, diarrhea and nausea.   Endocrine: Negative for cold intolerance, heat intolerance, polydipsia and polyuria.   Genitourinary:  Negative for dysuria, frequency and urgency.   Musculoskeletal:  Negative for arthralgias, joint swelling and myalgias.   Skin:  Negative for rash.        No suspicious lesions   Neurological:  Negative for weakness, numbness and headaches.   Hematological:  Negative for adenopathy.       Current Outpatient Medications on File Prior to Visit   Medication Sig    cetirizine (ZyrTEC) 10 mg tablet Take 10 mg by mouth daily    gabapentin (NEURONTIN) 300 mg capsule Take 1 capsule by mouth in the morning and, take 3 capsules by mouth at bedtime.    lisinopril (ZESTRIL) 20 mg tablet Take 1 tablet by mouth daily.    naproxen (Naprosyn) 500 mg tablet Take 1 tablet (500 mg total) by mouth 2 (two) times a day with meals    tamoxifen (NOLVADEX) 20 mg tablet Take 20 mg by mouth daily at bedtime    venlafaxine (EFFEXOR-XR) 150 mg 24 hr capsule Take 1 capsule (150 mg total) by mouth every evening       Objective     /76   Pulse 90   Temp 97.9 °F (36.6 °C) (Temporal)   Resp 18   Ht 5' 7\" (1.702 m)   Wt 92 kg (202 lb 12.8 oz)   SpO2 96%   BMI 31.76 kg/m²     Physical Exam  Constitutional:       General: She is not in acute distress.     Appearance: Normal appearance. She is well-developed.   HENT:      Head: Normocephalic and atraumatic.      Right Ear: Tympanic membrane, ear canal and external ear normal.      Left Ear: Tympanic membrane, ear canal and external ear normal.      Nose: Nose normal.      Mouth/Throat:      Mouth: Mucous membranes are moist.      Pharynx: Oropharynx is clear. No posterior oropharyngeal erythema.   Eyes:      Extraocular Movements: Extraocular movements intact.      " Conjunctiva/sclera: Conjunctivae normal.      Pupils: Pupils are equal, round, and reactive to light.   Neck:      Thyroid: No thyromegaly.      Vascular: No carotid bruit or JVD.   Cardiovascular:      Rate and Rhythm: Normal rate and regular rhythm.      Heart sounds: S1 normal and S2 normal. No murmur heard.     No friction rub. No gallop. No S3 or S4 sounds.   Pulmonary:      Effort: Pulmonary effort is normal.      Breath sounds: Normal breath sounds. No wheezing, rhonchi or rales.   Abdominal:      General: Bowel sounds are normal. There is no distension.      Palpations: Abdomen is soft.      Tenderness: There is no abdominal tenderness.   Musculoskeletal:      Cervical back: Neck supple.   Lymphadenopathy:      Cervical: No cervical adenopathy.   Neurological:      General: No focal deficit present.      Mental Status: She is alert and oriented to person, place, and time.      Cranial Nerves: No cranial nerve deficit.      Sensory: No sensory deficit.      Deep Tendon Reflexes: Reflexes are normal and symmetric. Reflexes normal.       Nati Garcia DO

## 2024-05-14 NOTE — PATIENT INSTRUCTIONS
Tdap and shingrix today  Return 3 months nurse visit shingrix and prevnar  Continue current meds  F/u 6 mo with labs prior

## 2024-06-19 ENCOUNTER — OCCMED (OUTPATIENT)
Age: 54
End: 2024-06-19

## 2024-06-19 ENCOUNTER — APPOINTMENT (OUTPATIENT)
Age: 54
End: 2024-06-19

## 2024-06-19 DIAGNOSIS — Z02.1 ENCOUNTER FOR PRE-EMPLOYMENT EXAMINATION: ICD-10-CM

## 2024-06-19 DIAGNOSIS — Z02.1 ENCOUNTER FOR PRE-EMPLOYMENT EXAMINATION: Primary | ICD-10-CM

## 2024-06-19 LAB
MEV IGG SER QL IA: NORMAL
MUV IGG SER QL IA: NORMAL
RUBV IGG SERPL IA-ACNC: 20.8 IU/ML
VZV IGG SER QL IA: NORMAL

## 2024-06-19 PROCEDURE — 86787 VARICELLA-ZOSTER ANTIBODY: CPT

## 2024-06-19 PROCEDURE — 86735 MUMPS ANTIBODY: CPT

## 2024-06-19 PROCEDURE — 86765 RUBEOLA ANTIBODY: CPT

## 2024-06-19 PROCEDURE — 86480 TB TEST CELL IMMUN MEASURE: CPT

## 2024-06-19 PROCEDURE — 86762 RUBELLA ANTIBODY: CPT

## 2024-06-19 PROCEDURE — 36415 COLL VENOUS BLD VENIPUNCTURE: CPT

## 2024-06-20 ENCOUNTER — TELEPHONE (OUTPATIENT)
Dept: HEMATOLOGY ONCOLOGY | Facility: CLINIC | Age: 54
End: 2024-06-20

## 2024-06-20 LAB
GAMMA INTERFERON BACKGROUND BLD IA-ACNC: 0 IU/ML
M TB IFN-G BLD-IMP: NEGATIVE
M TB IFN-G CD4+ BCKGRND COR BLD-ACNC: 0 IU/ML
M TB IFN-G CD4+ BCKGRND COR BLD-ACNC: 0 IU/ML
MITOGEN IGNF BCKGRD COR BLD-ACNC: 10 IU/ML

## 2024-06-21 ENCOUNTER — APPOINTMENT (OUTPATIENT)
Dept: LAB | Facility: CLINIC | Age: 54
End: 2024-06-21
Payer: COMMERCIAL

## 2024-06-21 DIAGNOSIS — C50.811 MALIGNANT NEOPLASM OF OVERLAPPING SITES OF BOTH BREASTS IN FEMALE, ESTROGEN RECEPTOR POSITIVE (HCC): ICD-10-CM

## 2024-06-21 DIAGNOSIS — Z17.0 MALIGNANT NEOPLASM OF OVERLAPPING SITES OF BOTH BREASTS IN FEMALE, ESTROGEN RECEPTOR POSITIVE (HCC): ICD-10-CM

## 2024-06-21 DIAGNOSIS — C50.812 MALIGNANT NEOPLASM OF OVERLAPPING SITES OF BOTH BREASTS IN FEMALE, ESTROGEN RECEPTOR POSITIVE (HCC): ICD-10-CM

## 2024-06-21 LAB
ALBUMIN SERPL BCG-MCNC: 4 G/DL (ref 3.5–5)
ALP SERPL-CCNC: 70 U/L (ref 34–104)
ALT SERPL W P-5'-P-CCNC: 11 U/L (ref 7–52)
ANION GAP SERPL CALCULATED.3IONS-SCNC: 6 MMOL/L (ref 4–13)
AST SERPL W P-5'-P-CCNC: 14 U/L (ref 13–39)
BASOPHILS # BLD AUTO: 0.04 THOUSANDS/ÂΜL (ref 0–0.1)
BASOPHILS NFR BLD AUTO: 0 % (ref 0–1)
BILIRUB SERPL-MCNC: 0.23 MG/DL (ref 0.2–1)
BUN SERPL-MCNC: 13 MG/DL (ref 5–25)
CALCIUM SERPL-MCNC: 8.8 MG/DL (ref 8.4–10.2)
CHLORIDE SERPL-SCNC: 105 MMOL/L (ref 96–108)
CO2 SERPL-SCNC: 28 MMOL/L (ref 21–32)
CREAT SERPL-MCNC: 0.75 MG/DL (ref 0.6–1.3)
EOSINOPHIL # BLD AUTO: 0.17 THOUSAND/ÂΜL (ref 0–0.61)
EOSINOPHIL NFR BLD AUTO: 2 % (ref 0–6)
ERYTHROCYTE [DISTWIDTH] IN BLOOD BY AUTOMATED COUNT: 13.2 % (ref 11.6–15.1)
GFR SERPL CREATININE-BSD FRML MDRD: 91 ML/MIN/1.73SQ M
GLUCOSE SERPL-MCNC: 142 MG/DL (ref 65–140)
HCT VFR BLD AUTO: 38.7 % (ref 34.8–46.1)
HGB BLD-MCNC: 12.5 G/DL (ref 11.5–15.4)
IMM GRANULOCYTES # BLD AUTO: 0.03 THOUSAND/UL (ref 0–0.2)
IMM GRANULOCYTES NFR BLD AUTO: 0 % (ref 0–2)
LYMPHOCYTES # BLD AUTO: 2.17 THOUSANDS/ÂΜL (ref 0.6–4.47)
LYMPHOCYTES NFR BLD AUTO: 22 % (ref 14–44)
MCH RBC QN AUTO: 29.6 PG (ref 26.8–34.3)
MCHC RBC AUTO-ENTMCNC: 32.3 G/DL (ref 31.4–37.4)
MCV RBC AUTO: 92 FL (ref 82–98)
MONOCYTES # BLD AUTO: 0.8 THOUSAND/ÂΜL (ref 0.17–1.22)
MONOCYTES NFR BLD AUTO: 8 % (ref 4–12)
NEUTROPHILS # BLD AUTO: 6.86 THOUSANDS/ÂΜL (ref 1.85–7.62)
NEUTS SEG NFR BLD AUTO: 68 % (ref 43–75)
NRBC BLD AUTO-RTO: 0 /100 WBCS
PLATELET # BLD AUTO: 280 THOUSANDS/UL (ref 149–390)
PMV BLD AUTO: 8.6 FL (ref 8.9–12.7)
POTASSIUM SERPL-SCNC: 3.6 MMOL/L (ref 3.5–5.3)
PROT SERPL-MCNC: 6.8 G/DL (ref 6.4–8.4)
RBC # BLD AUTO: 4.23 MILLION/UL (ref 3.81–5.12)
SODIUM SERPL-SCNC: 139 MMOL/L (ref 135–147)
WBC # BLD AUTO: 10.07 THOUSAND/UL (ref 4.31–10.16)

## 2024-06-21 PROCEDURE — 86300 IMMUNOASSAY TUMOR CA 15-3: CPT

## 2024-06-21 PROCEDURE — 80053 COMPREHEN METABOLIC PANEL: CPT

## 2024-06-21 PROCEDURE — 85025 COMPLETE CBC W/AUTO DIFF WBC: CPT

## 2024-06-21 PROCEDURE — 36415 COLL VENOUS BLD VENIPUNCTURE: CPT

## 2024-06-22 LAB — CANCER AG27-29 SERPL-ACNC: 19.3 U/ML (ref 0–38.6)

## 2024-06-24 ENCOUNTER — TELEPHONE (OUTPATIENT)
Dept: HEMATOLOGY ONCOLOGY | Facility: CLINIC | Age: 54
End: 2024-06-24

## 2024-06-24 ENCOUNTER — TELEMEDICINE (OUTPATIENT)
Dept: HEMATOLOGY ONCOLOGY | Facility: CLINIC | Age: 54
End: 2024-06-24
Payer: COMMERCIAL

## 2024-06-24 DIAGNOSIS — C50.812 MALIGNANT NEOPLASM OF OVERLAPPING SITES OF BOTH BREASTS IN FEMALE, ESTROGEN RECEPTOR POSITIVE (HCC): Primary | ICD-10-CM

## 2024-06-24 DIAGNOSIS — I10 PRIMARY HYPERTENSION: ICD-10-CM

## 2024-06-24 DIAGNOSIS — G47.00 INSOMNIA, UNSPECIFIED TYPE: ICD-10-CM

## 2024-06-24 DIAGNOSIS — C50.811 MALIGNANT NEOPLASM OF OVERLAPPING SITES OF BOTH BREASTS IN FEMALE, ESTROGEN RECEPTOR POSITIVE (HCC): Primary | ICD-10-CM

## 2024-06-24 DIAGNOSIS — R94.8 ABNORMAL RADIONUCLIDE BONE SCAN: ICD-10-CM

## 2024-06-24 DIAGNOSIS — R94.8 ABNORMAL POSITRON EMISSION TOMOGRAPHY (PET) SCAN: ICD-10-CM

## 2024-06-24 DIAGNOSIS — F32.A DEPRESSION, UNSPECIFIED DEPRESSION TYPE: ICD-10-CM

## 2024-06-24 DIAGNOSIS — Z17.0 MALIGNANT NEOPLASM OF OVERLAPPING SITES OF BOTH BREASTS IN FEMALE, ESTROGEN RECEPTOR POSITIVE (HCC): Primary | ICD-10-CM

## 2024-06-24 DIAGNOSIS — R59.0 CERVICAL LYMPHADENOPATHY: ICD-10-CM

## 2024-06-24 PROCEDURE — 99212 OFFICE O/P EST SF 10 MIN: CPT | Performed by: INTERNAL MEDICINE

## 2024-06-24 NOTE — TELEPHONE ENCOUNTER
Patient Call    Who are you speaking with? Patient    If it is not the patient, are they listed on an active communication consent form? N/A   What is the reason for this call? Patient is having car troubles and would like to make her 820 appointment today a phone call    Does this require a call back? Yes   If a call back is required, please list best call back number [phone]   If a call back is required, advise that a message will be forwarded to their care team and someone will return their call as soon as possible.   Did you relay this information to the patient? Yes

## 2024-06-24 NOTE — TELEPHONE ENCOUNTER
A call was placed to Magui, reached her voicemail. Messages were left to call the office to schedule a 3m f/u and to call Central Scheduling to schedule the Pet/Ct.

## 2024-06-25 NOTE — PROGRESS NOTES
Virtual Brief Visit on 6/20/2024    This Visit is being completed by telephone. The Patient is located at Home and in the following state in which I hold an active license [place]    The patient was identified by name and date of birth. Magui Wren was informed that this is a telemedicine visit and that the visit is being conducted through the Epic Embedded platform. She agrees to proceed..  My office door was closed. No one else was in the room.  She acknowledged consent and understanding of privacy and security of the video platform. The patient has agreed to participate and understands they can discontinue the visit at any time.    Patient is aware this is a billable service.   HPI and review of systems:  Patient has history of bilateral breast cancers and this is continuation of care.  She had inconclusive abnormal findings on bone scan, MRI scan and PET CT scan.  PET CT scan will be repeated.  Negative genetic testing.  She had invasive ductal carcinoma that was larger on the right side than left side. She states stage was 3 and lymph nodes were positive in right axilla but she does not know how many. After surgery she had radiation and she thinks that was given to the right side and she had that from October 2022- December 2020. She was started on tamoxifen 20 mg daily. She states she did not receive tamoxifen when she was getting radiation. Patient stopped having menstrual periods right after surgery, even before she started tamoxifen.  No menstrual period since.  FSH favored premenopausal status and she is staying on tamoxifen.  She has been on Effexor for depression.  History of hypertension and seasonal allergies.  Patient states she had shingles shot recently and had fevers and headaches for 4 days.  She is feeling better now.  She states she will be going for pneumococcal vaccine.  He is trying to stay up-to-date with her medical checkups.  Presently no other neurological, cardiac, pulmonary, GI and   symptoms.  Presently no fever, chills, bleeding, bone pains, skin rash, weight loss, night sweats, swelling of the ankles and swollen glands.    Ref Range & Units 6/21/24  9:59 AM 11/22/23  7:00 AM 7/19/23 12:58 PM   Sodium  135 - 147 mmol/L 139 138 138   Potassium  3.5 - 5.3 mmol/L 3.6 4.2 4.0   Chloride  96 - 108 mmol/L 105 104 103   CO2  21 - 32 mmol/L 28 29 30   ANION GAP  4 - 13 mmol/L 6 5 R 5 R   BUN  5 - 25 mg/dL 13 13 13   Creatinine  0.60 - 1.30 mg/dL 0.75 0.64 CM 0.64 CM   Comment: Standardized to IDMS reference method   Glucose  65 - 140 mg/dL 142 High   80 CM   Comment: If the patient is fasting, the ADA then defines impaired fasting glucose as > 100 mg/dL and diabetes as > or equal to 123 mg/dL.   Calcium  8.4 - 10.2 mg/dL 8.8 9.1 9.3   AST  13 - 39 U/L 14 14 17   ALT  7 - 52 U/L 11 11 CM 16 CM   Comment: Specimen collection should occur prior to Sulfasalazine administration due to the potential for falsely depressed results.   Alkaline Phosphatase  34 - 104 U/L 70 69 67   Total Protein  6.4 - 8.4 g/dL 6.8 7.2 7.3   Albumin  3.5 - 5.0 g/dL 4.0 4.2 4.4   Total Bilirubin  0.20 - 1.00 mg/dL 0.23 0.37 CM 0.31 CM   Comment: Use of this assay is not recommended for patients undergoing treatment with eltrombopag           Component  Ref Range & Units 6/21/24  9:59 AM 11/22/23  7:00 AM 7/19/23 12:58 PM   CA 27-29  0.0 - 38.6 U/mL 19.3 17.6 CM 14.0 CM   Comment: Siemens ApplyMapaur Immunochemiluminometric Methodology (ICMA)  Values obtained with different assay methods or kits cannot be used  interchangeably. Results cannot be interpreted as absolute evidence  of the presence or absence of malignant disease.      CBC and differential  Status: Final result      Contains abnormal data CBC and differential  Order: [id number]   Status: Final result       Visible to patient: Yes (seen)       Next appt: 06/26/2024 at 10:00 AM in Obstetrics and Gynecology (Tiara Cespedes PA-C)       Dx: Malignant neoplasm of  overlapping sit...    0 Result Notes        Component  Ref Range & Units 6/21/24  9:59 AM 11/22/23  7:00 AM 7/19/23 12:58 PM   WBC  4.31 - 10.16 Thousand/uL 10.07 6.39 10.21 High    RBC  3.81 - 5.12 Million/uL 4.23 4.63 4.41   Hemoglobin  11.5 - 15.4 g/dL 12.5 13.3 13.3   Hematocrit  34.8 - 46.1 % 38.7 42.5 40.6   MCV  82 - 98 fL 92 92 92   MCH  26.8 - 34.3 pg 29.6 28.7 30.2   MCHC  31.4 - 37.4 g/dL 32.3 31.3 Low  32.8   RDW  11.6 - 15.1 % 13.2 12.8 12.5   MPV  8.9 - 12.7 fL 8.6 Low  8.5 Low  8.5 Low    Platelets  149 - 390 Thousands/uL 280 312 317   nRBC  /100 WBCs 0 0 0   Segmented %  43 - 75 % 68 60 62   Immature Grans %  0 - 2 % 0 0 0   Lymphocytes %  14 - 44 % 22 27 26   Monocytes %  4 - 12 % 8 10 9   Eosinophils Relative  0 - 6 % 2 2 2   Basophils Relative  0 - 1 % 0 1 1   Absolute Neutrophils  1.85 - 7.62 Thousands/µL 6.86 3.83 6.50   Absolute Immature Grans  0.00 - 0.20 Thousand/uL 0.03 0.02 0.03   Absolute Lymphocytes  0.60 - 4.47 Thousands/µL 2.17 1.72 2.60   Absolute Monocytes  0.17 - 1.22 Thousand/µL 0.80 0.65 0.88   Eosinophils Absolute  0.00 - 0.61 Thousand/µL 0.17 0.13 0.15   Basophils Absolute  0.00 - 0.10 Thousands/µL 0.04 0.04 0.05              Specimen Collected: 06/21/24  9:59 AM Last Resulted: 06/21/24 10:30 AM               IMPRESSION:     No focal FDG activity definitive for hypermetabolic malignancy/metastatic disease.     -However, note is made of subcentimeter, nonpathologic in size nonspecific mildly FDG avid bilateral upper cervical lymph nodes, possibly inflammatory/physiologic. Attention to this region on short interval follow-up imaging is recommended to exclude   developing cervical lymphadenopathy.     -Additional note is made of focal FDG activity involving the anterior left seventh rib in the region of new activity seen on recent bone scan which has the appearance of fracture deformity with hypermetabolic osseous metastatic disease/pathologic   fracture considered less likely.  Correlation with fracture history is recommended.     -No focal FDG activity involving the lateral right scapula in region of indeterminate activity seen on bone scan. There is nonspecific subtle non-FDG avid sclerosis involving the lateral right scapula for which differential diagnosis includes benign as   well as malignant etiologies.     -No focal FDG activity involving the calvarium. It is unclear whether or not the focus of activity involving the posterior midline of the calvarium seen on bone scan is covered on current PET/CT.     Please see above for details and additional findings.     The study was marked in EPIC for significant notification.           Workstation performed: [id number]        Imaging    NM PET CT skull base to mid thigh (Order: [id number]) - 3/4/2024  Assessment/Plan:  1. Malignant neoplasm of overlapping sites of both breasts in female, estrogen receptor positive (HCC)    - CBC and differential; Future  - Comprehensive metabolic panel; Future  - Cancer antigen 27.29; Future  - NM PET CT skull base to mid thigh; Future    2. Abnormal radionuclide bone scan    - CBC and differential; Future  - Comprehensive metabolic panel; Future  - Cancer antigen 27.29; Future  - NM PET CT skull base to mid thigh; Future    3. Abnormal positron emission tomography (PET) scan    - CBC and differential; Future  - Comprehensive metabolic panel; Future  - Cancer antigen 27.29; Future  - NM PET CT skull base to mid thigh; Future    4. Cervical lymphadenopathy    - CBC and differential; Future  - Comprehensive metabolic panel; Future  - Cancer antigen 27.29; Future  - NM PET CT skull base to mid thigh; Future    5. Primary hypertension      6. Depression, unspecified depression type      7. Insomnia, unspecified type  Blood work and PET scan end of August 2024.  Visit in 3 months.  All discussed with patient in detail.  Questions answered.  Patient will continue to follow with her primary physician and other  consultants.  Diet and activities as tolerated.  Goal is cure from breast cancers.  Discussed health screening tests including GYN examination and colonoscopy.  From the description it looks like patient is capable of self-care.  Provided counseling and support.  I used a dictation device to dictate this note and there could be mistakes in my note and for that patient may contact my office.    Problem List Items Addressed This Visit        Cardiovascular and Mediastinum    Primary hypertension       Immune and Lymphatic    Cervical lymphadenopathy    Relevant Orders    CBC and differential    Comprehensive metabolic panel    Cancer antigen 27.29    NM PET CT skull base to mid thigh       Behavioral Health    Depression       Oncology    Malignant neoplasm of overlapping sites of both breasts in female, estrogen receptor positive (HCC) - Primary    Relevant Orders    CBC and differential    Comprehensive metabolic panel    Cancer antigen 27.29    NM PET CT skull base to mid thigh       Orthopedic/Musculoskeletal    Abnormal radionuclide bone scan    Relevant Orders    CBC and differential    Comprehensive metabolic panel    Cancer antigen 27.29    NM PET CT skull base to mid thigh       Neurology/Sleep    Insomnia       Other    Abnormal positron emission tomography (PET) scan    Relevant Orders    CBC and differential    Comprehensive metabolic panel    Cancer antigen 27.29    NM PET CT skull base to mid thigh       Recent Visits  Date Type Provider Dept   06/24/24 Telephone Arianna Jacobs [facility]   06/24/24 Telephone Payton Lees [facility]   06/24/24 Telemedicine Kaleb Boyle MD [facility]   06/20/24 Telephone Elizabeth Gutierrez [facility] [place]   Showing recent visits within past 7 days and meeting all other requirements  Future Appointments  No visits were found meeting these conditions.  Showing future appointments within next 150 days and meeting  all other requirements         Visit Time  Total Visit Duration: 14 min

## 2024-06-26 ENCOUNTER — OFFICE VISIT (OUTPATIENT)
Dept: OBGYN CLINIC | Facility: CLINIC | Age: 54
End: 2024-06-26
Payer: COMMERCIAL

## 2024-06-26 ENCOUNTER — TELEPHONE (OUTPATIENT)
Age: 54
End: 2024-06-26

## 2024-06-26 VITALS
DIASTOLIC BLOOD PRESSURE: 80 MMHG | SYSTOLIC BLOOD PRESSURE: 134 MMHG | BODY MASS INDEX: 31.77 KG/M2 | HEIGHT: 67 IN | WEIGHT: 202.4 LBS

## 2024-06-26 DIAGNOSIS — C50.812 MALIGNANT NEOPLASM OF OVERLAPPING SITES OF BOTH BREASTS IN FEMALE, ESTROGEN RECEPTOR POSITIVE (HCC): ICD-10-CM

## 2024-06-26 DIAGNOSIS — G89.28 CHRONIC POST-OPERATIVE PAIN: ICD-10-CM

## 2024-06-26 DIAGNOSIS — F41.9 ANXIOUSNESS: ICD-10-CM

## 2024-06-26 DIAGNOSIS — G47.00 INSOMNIA: ICD-10-CM

## 2024-06-26 DIAGNOSIS — Z51.5 PALLIATIVE CARE PATIENT: ICD-10-CM

## 2024-06-26 DIAGNOSIS — Z12.4 SCREENING FOR MALIGNANT NEOPLASM OF CERVIX: ICD-10-CM

## 2024-06-26 DIAGNOSIS — F32.A DEPRESSION: ICD-10-CM

## 2024-06-26 DIAGNOSIS — Z17.0 MALIGNANT NEOPLASM OF OVERLAPPING SITES OF BOTH BREASTS IN FEMALE, ESTROGEN RECEPTOR POSITIVE (HCC): ICD-10-CM

## 2024-06-26 DIAGNOSIS — Z00.00 PHYSICAL EXAM, ANNUAL: ICD-10-CM

## 2024-06-26 DIAGNOSIS — Z12.31 ENCOUNTER FOR SCREENING MAMMOGRAM FOR MALIGNANT NEOPLASM OF BREAST: ICD-10-CM

## 2024-06-26 DIAGNOSIS — C50.811 MALIGNANT NEOPLASM OF OVERLAPPING SITES OF BOTH BREASTS IN FEMALE, ESTROGEN RECEPTOR POSITIVE (HCC): ICD-10-CM

## 2024-06-26 DIAGNOSIS — Z11.51 SCREENING FOR HUMAN PAPILLOMAVIRUS (HPV): ICD-10-CM

## 2024-06-26 DIAGNOSIS — Z01.419 WELL WOMAN EXAM WITH ROUTINE GYNECOLOGICAL EXAM: Primary | ICD-10-CM

## 2024-06-26 DIAGNOSIS — G89.3 CANCER RELATED PAIN: ICD-10-CM

## 2024-06-26 PROBLEM — R63.0 LOSS OF APPETITE: Status: RESOLVED | Noted: 2023-06-28 | Resolved: 2024-06-26

## 2024-06-26 PROBLEM — Z71.89 GOALS OF CARE, COUNSELING/DISCUSSION: Status: RESOLVED | Noted: 2024-01-15 | Resolved: 2024-06-26

## 2024-06-26 PROCEDURE — G0476 HPV COMBO ASSAY CA SCREEN: HCPCS

## 2024-06-26 PROCEDURE — 99386 PREV VISIT NEW AGE 40-64: CPT

## 2024-06-26 PROCEDURE — G0145 SCR C/V CYTO,THINLAYER,RESCR: HCPCS

## 2024-06-26 RX ORDER — GABAPENTIN 300 MG/1
CAPSULE ORAL
Qty: 360 CAPSULE | Refills: 0 | Status: SHIPPED | OUTPATIENT
Start: 2024-06-26

## 2024-06-26 NOTE — TELEPHONE ENCOUNTER
Haily called in to return Key's call from Palliative about the pt. Haily stated that Key will know what it is in regards to.  Please call Haily back at [phone]. Thank you.

## 2024-06-26 NOTE — TELEPHONE ENCOUNTER
Last appointment:5/1/24    Next scheduled appointment:11/11/24    Pharmacy: [facility]

## 2024-06-26 NOTE — PROGRESS NOTES
ASSESSMENT & PLAN:   - Discussed options for contraceptives today including progesterone-only pills, Nexplanon, IUD, and depo-provera. Patient is currently using spermicides and condoms and would like to continue this for now.     Diagnoses and all orders for this visit:    Well woman exam with routine gynecological exam  -     Liquid-based pap, screening    Physical exam, annual  -     Ambulatory Referral to Obstetrics / Gynecology    Screening for human papillomavirus (HPV)  -     Liquid-based pap, screening    Encounter for screening mammogram for malignant neoplasm of breast  -     Mammo screening bilateral w 3d & cad; Future    Screening for malignant neoplasm of cervix  -     Liquid-based pap, screening    Malignant neoplasm of overlapping sites of both breasts in female, estrogen receptor positive (HCC)      The following were reviewed in today's visit: ASCCP guidelines, Gardisil vaccination, STD testing breast self exam, mammography screening ordered, STD testing, exercise, and healthy diet.    Patient to return to office in yearly for annual exam.     All questions have been answered to her satisfaction.    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 Bilateral mastectomy 2020.       HPI: Magui Wren is a 54 y.o.  who presents for annual gynecologic examination.  She has the following concerns:    History of bilateral ER+ breast cancer - invasive ductal carcinoma. Treated with b/l mastectomy 2020, chemotherapy and radiation following this. Currently taking Tamoxifen. Following with [facility]. She does get frequent imaging studies done every 6 months with [facility]. She will ask  if he would like her to complete mammogram.   Patient has not had a menstrual period since her mastectomy in , FSH 2023 - 8.1. She is currently using spermicides and  "condoms for contraception. She is wondering about her other options for contraception.       Health Maintenance:    Exercise: infrequently  Breast exams/breast awareness: yes  Last mammogram: patient has full-body MRI every 6 months, last MRI was 3/4/24 showing \"CHEST: No FDG avid soft tissue lesions are seen. CT images: Bilateral mastectomy and postsurgical changes in the axillary regions. Atherosclerotic vascular calcifications are noted. Likely emphysematous changes with nonspecific mosaic attenuation involving the lungs.\"  Colorectal cancer screening: Cologuard 2023, negative     Past Medical History:   Diagnosis Date    Allergies     Cancer (HCC) [id number]    Depression 2023    Hypertension [id number]    Malignant neoplasm of overlapping sites of both breasts in female, estrogen receptor positive (HCC) 2023    Primary hypertension 2023     Past Surgical History:   Procedure Laterality Date    BREAST BIOPSY       SECTION  2006    MASTECTOMY Bilateral      Past OB/Gyn History:   Patient's last menstrual period was 2020 (exact date).    Menopausal status: perimenopausal  Menopausal symptoms: vaginal dryness    Last Pap: ~2019 : no abnormalities, no records available  History of abnormal Pap smear: no    Patient is currently sexually active.   STD testing: no  Current contraception:  spermicides    Family History  Family History   Problem Relation Age of Onset    Pulmonary embolism Mother 57    Diabetes Mother     Heart attack Father 60    Diabetes Father     Hypertension Father     Skin cancer Sister     Cancer Sister         Skin cancer    Urolithiasis Brother     Urolithiasis Brother     Lung cancer Maternal Grandfather     Cancer Maternal Grandfather         Lung    Lung cancer Paternal Grandmother     Cancer Paternal Grandmother         Lung     Family history of uterine or ovarian cancer: no  Family history of breast cancer: no  Family history of colon cancer: no    Social " History:  Social History     Socioeconomic History    Marital status:      Spouse name: Not on file    Number of children: Not on file    Years of education: Not on file    Highest education level: Not on file   Occupational History    Not on file   Tobacco Use    Smoking status: Former     Current packs/day: 0.00     Average packs/day: 0.5 packs/day for 21.0 years (10.5 ttl pk-yrs)     Types: Cigarettes     Start date:      Quit date:      Years since quittin.4    Smokeless tobacco: Never    Tobacco comments:     quit smoking cigarettes in  after smoking 1/3 pack of cigarette a day for 20 years   Vaping Use    Vaping status: Never Used   Substance and Sexual Activity    Alcohol use: Not Currently    Drug use: Yes     Types: Marijuana     Comment: medical; mostly edibles    Sexual activity: Yes     Partners: Male     Birth control/protection: None   Other Topics Concern    Not on file   Social History Narrative    Per 23 Oncology note: Patient has 1 son and that was born when she was 35 and prior to that she had 2 miscarriages and had fertility treatments.       Social Determinants of Health     Financial Resource Strain: Low Risk  (2023)    Overall Financial Resource Strain (CARDIA)     Difficulty of Paying Living Expenses: Not hard at all   Food Insecurity: No Food Insecurity (2023)    Hunger Vital Sign     Worried About Running Out of Food in the Last Year: Never true     Ran Out of Food in the Last Year: Never true   Transportation Needs: No Transportation Needs (2023)    PRAPARE - Transportation     Lack of Transportation (Medical): No     Lack of Transportation (Non-Medical): No   Physical Activity: Not on file   Stress: Not on file   Social Connections: Not on file   Intimate Partner Violence: Not on file   Housing Stability: High Risk (2023)    Housing Stability Vital Sign     Unable to Pay for Housing in the Last Year: No     Number of Times Moved in the Last  "Year: 2     Homeless in the Last Year: No     Domestic violence screen: negative    Allergies:  Allergies   Allergen Reactions    Peanut-Containing Drug Products - Food Allergy Anaphylaxis and Throat Swelling    Latex Other (See Comments)     Per 4/24/23 Oncology note    Penicillins Other (See Comments)     Per 4/24/23 Oncology note       Medications:    Current Outpatient Medications:     cetirizine (ZyrTEC) 10 mg tablet, Take 10 mg by mouth daily, Disp: , Rfl:     lisinopril (ZESTRIL) 20 mg tablet, Take 1 tablet by mouth daily., Disp: 90 tablet, Rfl: 1    tamoxifen (NOLVADEX) 20 mg tablet, Take 20 mg by mouth daily at bedtime, Disp: , Rfl:     venlafaxine (EFFEXOR-XR) 150 mg 24 hr capsule, Take 1 capsule (150 mg total) by mouth every evening, Disp: 30 capsule, Rfl: 5    gabapentin (NEURONTIN) 300 mg capsule, Take 1 capsule by mouth in the morning and, take 3 capsules by mouth at bedtime., Disp: 360 capsule, Rfl: 0    naproxen (Naprosyn) 500 mg tablet, Take 1 tablet (500 mg total) by mouth 2 (two) times a day with meals (Patient not taking: Reported on 6/26/2024), Disp: 30 tablet, Rfl: 0    Review of Systems:  Review of Systems   Constitutional:  Negative for chills and fever.   Respiratory:  Negative for shortness of breath.    Cardiovascular:  Negative for chest pain.   Gastrointestinal:  Negative for abdominal pain, constipation and diarrhea.   Genitourinary:  Negative for dysuria, frequency, pelvic pain, vaginal discharge and vaginal pain.   Psychiatric/Behavioral:  Negative for self-injury and suicidal ideas.          Physical Exam:  /80 (BP Location: Right arm, Patient Position: Sitting, Cuff Size: Standard)   Ht 5' 7\" (1.702 m)   Wt 91.8 kg (202 lb 6.4 oz)   LMP 07/13/2020 (Exact Date)   BMI 31.70 kg/m²    Physical Exam  Constitutional:       Appearance: Normal appearance.   Genitourinary:      Vulva and urethral meatus normal.      No labial fusion noted.      No vaginal erythema or tenderness. "        Right Adnexa: not tender.     Left Adnexa: not tender.     No cervical discharge or friability.      Uterus is not tender.   Breasts:     Right: Absent.      Left: Absent.      Breast exam comments: S/p bilateral mastectomy .  HENT:      Head: Normocephalic and atraumatic.   Neck:      Thyroid: No thyroid mass or thyroid tenderness.   Cardiovascular:      Rate and Rhythm: Normal rate and regular rhythm.      Heart sounds: Normal heart sounds. No murmur heard.  Pulmonary:      Effort: Pulmonary effort is normal.      Breath sounds: Normal breath sounds. No wheezing.   Abdominal:      Palpations: Abdomen is soft. There is no mass.      Tenderness: There is no abdominal tenderness.   Lymphadenopathy:      Cervical: No cervical adenopathy.   Neurological:      General: No focal deficit present.      Mental Status: She is alert and oriented to person, place, and time.   Skin:     General: Skin is warm and dry.   Psychiatric:         Mood and Affect: Mood normal.         Behavior: Behavior normal.   Vitals and nursing note reviewed.

## 2024-06-27 LAB
HPV HR 12 DNA CVX QL NAA+PROBE: NEGATIVE
HPV16 DNA CVX QL NAA+PROBE: NEGATIVE
HPV18 DNA CVX QL NAA+PROBE: NEGATIVE

## 2024-07-03 LAB
LAB AP GYN PRIMARY INTERPRETATION: NORMAL
Lab: NORMAL

## 2024-08-22 DIAGNOSIS — G89.28 CHRONIC POST-OPERATIVE PAIN: ICD-10-CM

## 2024-08-22 DIAGNOSIS — Z17.0 MALIGNANT NEOPLASM OF OVERLAPPING SITES OF BOTH BREASTS IN FEMALE, ESTROGEN RECEPTOR POSITIVE (HCC): ICD-10-CM

## 2024-08-22 DIAGNOSIS — G89.3 CANCER RELATED PAIN: ICD-10-CM

## 2024-08-22 DIAGNOSIS — Z51.5 PALLIATIVE CARE PATIENT: ICD-10-CM

## 2024-08-22 DIAGNOSIS — C50.811 MALIGNANT NEOPLASM OF OVERLAPPING SITES OF BOTH BREASTS IN FEMALE, ESTROGEN RECEPTOR POSITIVE (HCC): ICD-10-CM

## 2024-08-22 DIAGNOSIS — G47.00 INSOMNIA: ICD-10-CM

## 2024-08-22 DIAGNOSIS — F32.A DEPRESSION: ICD-10-CM

## 2024-08-22 DIAGNOSIS — C50.812 MALIGNANT NEOPLASM OF OVERLAPPING SITES OF BOTH BREASTS IN FEMALE, ESTROGEN RECEPTOR POSITIVE (HCC): ICD-10-CM

## 2024-08-22 DIAGNOSIS — F41.9 ANXIOUSNESS: ICD-10-CM

## 2024-08-22 RX ORDER — GABAPENTIN 300 MG/1
CAPSULE ORAL
Qty: 360 CAPSULE | Refills: 0 | Status: SHIPPED | OUTPATIENT
Start: 2024-08-22

## 2024-08-22 NOTE — TELEPHONE ENCOUNTER
Last appointment:5/1/24    Next scheduled appointment:11/11/24    Pharmacy: [facility]

## 2024-08-27 ENCOUNTER — HOSPITAL ENCOUNTER (OUTPATIENT)
Dept: NUCLEAR MEDICINE | Facility: HOSPITAL | Age: 54
Discharge: HOME | End: 2024-08-27
Attending: INTERNAL MEDICINE
Payer: COMMERCIAL

## 2024-08-27 DIAGNOSIS — C50.812 MALIGNANT NEOPLASM OF OVERLAPPING SITES OF BOTH BREASTS IN FEMALE, ESTROGEN RECEPTOR POSITIVE (HCC): ICD-10-CM

## 2024-08-27 DIAGNOSIS — R59.0 CERVICAL LYMPHADENOPATHY: ICD-10-CM

## 2024-08-27 DIAGNOSIS — Z17.0 MALIGNANT NEOPLASM OF OVERLAPPING SITES OF BOTH BREASTS IN FEMALE, ESTROGEN RECEPTOR POSITIVE (HCC): ICD-10-CM

## 2024-08-27 DIAGNOSIS — R94.8 ABNORMAL POSITRON EMISSION TOMOGRAPHY (PET) SCAN: ICD-10-CM

## 2024-08-27 DIAGNOSIS — R94.8 ABNORMAL RADIONUCLIDE BONE SCAN: ICD-10-CM

## 2024-08-27 DIAGNOSIS — C50.811 MALIGNANT NEOPLASM OF OVERLAPPING SITES OF BOTH BREASTS IN FEMALE, ESTROGEN RECEPTOR POSITIVE (HCC): ICD-10-CM

## 2024-08-27 LAB — GLUCOSE SERPL-MCNC: 97 MG/DL (ref 65–140)

## 2024-08-27 PROCEDURE — 78815 PET IMAGE W/CT SKULL-THIGH: CPT

## 2024-08-27 PROCEDURE — 82948 REAGENT STRIP/BLOOD GLUCOSE: CPT

## 2024-08-27 PROCEDURE — A9552 F18 FDG: HCPCS

## 2024-09-17 ENCOUNTER — TELEMEDICINE (OUTPATIENT)
Dept: PALLIATIVE MEDICINE | Facility: CLINIC | Age: 54
End: 2024-09-17
Payer: COMMERCIAL

## 2024-09-17 DIAGNOSIS — Z17.0 MALIGNANT NEOPLASM OF OVERLAPPING SITES OF BOTH BREASTS IN FEMALE, ESTROGEN RECEPTOR POSITIVE (HCC): Primary | ICD-10-CM

## 2024-09-17 DIAGNOSIS — G89.3 CANCER RELATED PAIN: ICD-10-CM

## 2024-09-17 DIAGNOSIS — Z51.5 PALLIATIVE CARE PATIENT: ICD-10-CM

## 2024-09-17 DIAGNOSIS — C50.812 MALIGNANT NEOPLASM OF OVERLAPPING SITES OF BOTH BREASTS IN FEMALE, ESTROGEN RECEPTOR POSITIVE (HCC): Primary | ICD-10-CM

## 2024-09-17 DIAGNOSIS — Z79.899 MEDICAL MARIJUANA USE: ICD-10-CM

## 2024-09-17 DIAGNOSIS — C50.811 MALIGNANT NEOPLASM OF OVERLAPPING SITES OF BOTH BREASTS IN FEMALE, ESTROGEN RECEPTOR POSITIVE (HCC): Primary | ICD-10-CM

## 2024-09-17 DIAGNOSIS — G47.00 INSOMNIA: ICD-10-CM

## 2024-09-17 PROCEDURE — 99213 OFFICE O/P EST LOW 20 MIN: CPT | Performed by: INTERNAL MEDICINE

## 2024-09-17 NOTE — ASSESSMENT & PLAN NOTE
Invasive ductal carcinoma of bilateral breasts, status post bilateral mastectomies and radiation, continuing on tamoxifen therapy. Continue disease-directed cares.

## 2024-09-17 NOTE — ASSESSMENT & PLAN NOTE
Emotional / psychosocial support provided today. Mood stable.  ACP: [facility] Advanced Directive on file.  Reviewed notes (ED< PCP, Medical Oncology, Gynecology), labs (6/21/24 Cr 0.75, alb 4.0, Hb 12.5, CA 27-29 19.3), imaging + procedures (8/27/24 PET CT, 2/9/24 MRI brain, 2/6/24 MRI R shoulder).. See below for more data.  Return in 8 weeks (on 11/11/2024) for next scheduled follow up w/ Dr Cummings.  Medication safety issues addressed - no driving under the influence of narcotics (including opioids), watch for adverse effects including AMS or respiratory depression (slowed breathing), keep medications stored in a safe/locked environment, do not use alcohol while opioids or other narcotics are in one's system, do not travel with more than the minimum number of tablets or capsules required for the trip.  I have personally queried the patient's controlled substance dispensing history in the Prescription Drug Monitoring Program in compliance with regulations before I have prescribed any controlled substances. The prescription history is consistent with prescribed therapy and our practice policies.

## 2024-09-17 NOTE — ASSESSMENT & PLAN NOTE
Patient initially certified for MMJ in the [facility] 6/28/23 for assistance with “chronic pain, loss of appetite, anxiousness, insomnia, depressed mood”.  The patient qualifies for use of MMJ in the [facility] by having the following medical condition: breast cancer.  From a palliative care standpoint the patient is suffering with insomnia, pain; in the past loss of appetite, anxiousness, depression have been issues. These symptoms and side effects might be alleviated with use of MMJ products. The patient renewed their registration online. A medical re-certification was completed on this date (certification ID #[id number]). The patient was given a copy of the medical certification renewal.  Patient may continue MMJ for symptom relief.

## 2024-09-17 NOTE — ASSESSMENT & PLAN NOTE
Chronic issue. Patient feels MMJ is helpful for this, and with proper pain control, her sleep improves.

## 2024-09-17 NOTE — PATIENT INSTRUCTIONS
It was good to see you today. Thank you for coming in.    Congratulations in advance, for your upcoming wedding!  We discussed medical marijuana (MMJ) today and I have issued a re-certification for its use. You should expect an e-mail from the [place] Dept of Health verifying this.  Please review and sign the informed consent document that we are mailing you; you and I reviewed it today. Please send back the signed copy (mail or fax), or bring it to your next Palliative visit.  Return in 8 weeks (on 11/11/2024) for next scheduled follow up w/ Dr Cummings.  Call us for refills on medications that we supply, as needed.   If something changes and you need to come in sooner, please call our office.    PRESCRIPTION REFILL REMINDER:  All medication refills should be requested prior to Noon on Friday. Any refill requests after noon on Friday would be addressed the following Monday.    MEDICATION SAFETY ISSUES:   Do not drive under the influence of narcotics (including opioids), watch for adverse effects including confusion / altered mental status / respiratory depression (slowed breathing), keep medications stored in a safe/locked environment, do not use alcohol while opioids or other narcotics are in your system. Do not travel with more than the minimum number of tablets or capsules required for the trip.

## 2024-09-17 NOTE — ASSESSMENT & PLAN NOTE
Some shift-work related sleep issues, but patient feels [facility] is helping with this.

## 2024-09-17 NOTE — PROGRESS NOTES
Virtual Regular Visit  Name: Magui Wren      : 1970      MRN: [id number]  Encounter Provider: Gideon Smith MD  Encounter Date: 2024   Encounter department: [facility]    Verification of patient location:    Intake and Identification:    Reason(s) for virtual visit: televideo, follow-up, symptom management, psychosocial support, and medical marijuana. The patient is unable to visit the clinic due to distance.    Encounter provider Gideon Smith MD, located at:  [address] [address] [phone]    Recent Visits  No visits were found meeting these conditions.  Showing recent visits within past 7 days and meeting all other requirements  Today's Visits  Date Type Provider Dept   24 Telemedicine Gideon Smith MD [facility]   Showing today's visits and meeting all other requirements  Future Appointments  No visits were found meeting these conditions.  Showing future appointments within next 150 days and meeting all other requirements       Patient agrees to participate in a virtual check in via telephone or video visit instead of presenting to the office to address urgent/immediate medical needs, or to respect quarantine and public health guidelines. Patient is aware this is a billable service.     After connecting through [employer], the patient was identified by name and date of birth. Magui Wren was informed that this was a telemedicine visit and that the visit is being conducted through the Epic Embedded platform. She agrees to proceed. My office door was closed. No one else was in the room. She acknowledged consent and understanding of privacy and security of the video platform. I informed the patient that I have reviewed her record in EPIC and presented the opportunity for her to ask any questions regarding the visit today. The patient has agreed to participate  and understands they can discontinue the visit at any time.     Assessment & Plan   1. Malignant neoplasm of overlapping sites of both breasts in female, estrogen receptor positive (HCC)  Assessment & Plan:  Invasive ductal carcinoma of bilateral breasts, status post bilateral mastectomies and radiation, continuing on tamoxifen therapy. Continue disease-directed cares.  2. Cancer related pain  Assessment & Plan:  Chronic issue. Patient feels MMJ is helpful for this, and with proper pain control, her sleep improves.  3. Insomnia  Assessment & Plan:  Some shift-work related sleep issues, but patient feels MMJ is helping with this.  4. Palliative care patient  Assessment & Plan:  Emotional / psychosocial support provided today. Mood stable.  [facility]  Reviewed notes (ED< PCP, Medical Oncology, Gynecology), labs (6/21/24 Cr 0.75, alb 4.0, Hb 12.5, CA 27-29 19.3), imaging + procedures (8/27/24 PET CT, 2/9/24 MRI brain, 2/6/24 MRI R shoulder).. See below for more data.  Return in 8 weeks (on 11/11/2024) for next scheduled follow up w/ Dr Cummings.  Medication safety issues addressed - no driving under the influence of narcotics (including opioids), watch for adverse effects including AMS or respiratory depression (slowed breathing), keep medications stored in a safe/locked environment, do not use alcohol while opioids or other narcotics are in one's system, do not travel with more than the minimum number of tablets or capsules required for the trip.  I have personally queried the patient's controlled substance dispensing history in the Prescription Drug Monitoring Program in compliance with regulations before I have prescribed any controlled substances. The prescription history is consistent with prescribed therapy and our practice policies.    5. Medical marijuana use  Assessment & Plan:  Patient initially certified for MMJ in the State of [place] 6/28/23 for assistance with “chronic pain, loss of  "appetite, anxiousness, insomnia, depressed mood”.  The patient qualifies for use of MMJ in the State [facility] by having the following medical condition: breast cancer.  From a palliative care standpoint the patient is suffering with insomnia, pain; in the past loss of appetite, anxiousness, depression have been issues. These symptoms and side effects might be alleviated with use of MMJ products. The patient renewed their registration online. A medical re-certification was completed on this date (certification ID #[id number]). The patient was given a copy of the medical certification renewal.  Patient may continue MMJ for symptom relief.      Subjective   Chief Complaint   Patient presents with    Cancer    Pain    Counseling    Insomnia    Follow-up    Virtual Regular Visit        History of Present Illness     Magui Wren is a 54 y.o. female w/ bilateral breast cancer (diagnosed 07/2020 as invasive ductal carcinoma) s/p bilateral mastectomies + RT, on tamoxifen; depression, anxiety, HTN, seasonal allergies. She follows w/ Dr Boyle (Medical Oncology).     Patient states she is doing well. 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 She feels her job has helped stabilize her mood. She is more active in her job and is able to get \"16,000 steps\" per day while at work. She is \"happy for the exercise\". She has some mild apprehension of what might happen if her Wannah patient dies, however.    Patient states the shift work can have some \"weird\" effects on her sleep. She generally works from 3 PM to 11 PM, though sometimes she will work a 12-hour shift. She feels MMJ products are helpful for her to achieve sleep onset and get 5 to 6 hours of restorative sleep. She also notes that MMJ helps control cancer related pain. She is primarily using edible MMJ products.    Patient shares that she is currently planning a wedding to her júnior Singh. He will be a " "large, fun wedding (with a \"bouncy house\" and a \"petting zoo\"). She is excitedly looking forward to the wedding.      Current Outpatient Medications:     cetirizine (ZyrTEC) 10 mg tablet, Take 10 mg by mouth daily, Disp: , Rfl:     gabapentin (NEURONTIN) 300 mg capsule, Take 1 capsule by mouth in the morning and, take 3 capsules by mouth at bedtime., Disp: 360 capsule, Rfl: 0    lisinopril (ZESTRIL) 20 mg tablet, Take 1 tablet by mouth daily., Disp: 90 tablet, Rfl: 1    naproxen (Naprosyn) 500 mg tablet, Take 1 tablet (500 mg total) by mouth 2 (two) times a day with meals (Patient not taking: Reported on 6/26/2024), Disp: 30 tablet, Rfl: 0    tamoxifen (NOLVADEX) 20 mg tablet, Take 20 mg by mouth daily at bedtime, Disp: , Rfl:     venlafaxine (EFFEXOR-XR) 150 mg 24 hr capsule, Take 1 capsule (150 mg total) by mouth every evening, Disp: 30 capsule, Rfl: 5    Objective   Video assessment of patient:   Physical Exam  Constitutional:       General: She is awake. She is not in acute distress.     Appearance: She is well-groomed and overweight. She is not toxic-appearing.   HENT:      Head: Normocephalic and atraumatic.      Right Ear: External ear normal.      Left Ear: External ear normal.   Eyes:      General: No scleral icterus.        Right eye: No discharge.         Left eye: No discharge.      Extraocular Movements: Extraocular movements intact.      Conjunctiva/sclera: Conjunctivae normal.   Pulmonary:      Effort: Pulmonary effort is normal. No respiratory distress.      Comments: Able to speak comfortably in complete sentences on room air at rest.  Abdominal:      Tenderness: There is no guarding.   Musculoskeletal:      Cervical back: Normal range of motion.   Skin:     Coloration: Skin is not pale.   Neurological:      Mental Status: She is alert and oriented to person, place, and time.      Cranial Nerves: No dysarthria or facial asymmetry.   Psychiatric:         Attention and Perception: Attention normal.    "      Mood and Affect: Mood normal.         Speech: Speech normal.         Behavior: Behavior normal. Behavior is cooperative.         Thought Content: Thought content normal.         Cognition and Memory: Cognition and memory normal.         Judgment: Judgment normal.       Recent labs:  Lab Results   Component Value Date/Time    SODIUM 139 06/21/2024 09:59 AM    K 3.6 06/21/2024 09:59 AM    BUN 13 06/21/2024 09:59 AM    CREATININE 0.75 06/21/2024 09:59 AM    GLUC 142 (H) 06/21/2024 09:59 AM    CALCIUM 8.8 06/21/2024 09:59 AM    AST 14 06/21/2024 09:59 AM    ALT 11 06/21/2024 09:59 AM    ALB 4.0 06/21/2024 09:59 AM    TP 6.8 06/21/2024 09:59 AM    EGFR 91 06/21/2024 09:59 AM     Lab Results   Component Value Date/Time    HGB 12.5 06/21/2024 09:59 AM    WBC 10.07 06/21/2024 09:59 AM     06/21/2024 09:59 AM     Lab Results   Component Value Date/Time    BQG0OXFBSMSI 0.782 11/22/2023 07:00 AM       Recent Imaging:  Procedure: NM PET CT skull base to mid thigh    Result Date: 8/27/2024  Narrative: PET/CT SCAN INDICATION: C50.811: Malignant neoplasm of overlapping sites of right female breast C50.812: Malignant neoplasm of overlapping sites of left female breast Z17.0: Estrogen receptor positive status (ER+) R94.8: Abnormal results of function studies of other organs and systems R59.0: Localized enlarged lymph nodes Bilateral breast cancer. Continuation of care. MODIFIER: PS COMPARISON: Prior PET/CT 3/4/2024. CELL TYPE: Bilateral breast ductal carcinoma TECHNIQUE:   10.6 mCi F-18-FDG administered IV. Multiplanar attenuation corrected and non attenuation corrected PET images are available for interpretation, and contiguous, low dose, axial CT sections were obtained from the skull base through the femurs.  Intravenous contrast material was not utilized. This examination, like all CT scans performed in the [facility], was performed utilizing techniques to minimize radiation dose exposure, including  the use of iterative reconstruction and automated exposure control. Fasting serum glucose: 97 mg/dl FINDINGS: All images refer to series 4 unless otherwise specified. VISUALIZED BRAIN: No acute abnormalities are seen. HEAD/NECK: No FDG avid findings suspicious for pathologic cervical adenopathy or other form of soft tissue metastasis. Prior study noted a right level 2 lymph node with an SUV max of 2.8 and short axis diameter of 7 mm. This is seen currently on image 47, unchanged  in size, SUV max now 1.6. CT images: Stable CHEST: No FDG avid soft tissue lesions are seen. CT images: Coronary artery calcification. Bullous changes within the lungs. ABDOMEN: No FDG avid soft tissue lesions are seen. CT images: Hepatic steatosis. Nonobstructing right renal calculus. Small hiatal hernia. PELVIS: No FDG avid soft tissue lesions are seen. Activity in the region of the perineum is most likely radioactive urine refluxing into the vaginal fornices. CT images: Stable appearance of the uterus. Scattered colonic diverticula. OSSEOUS STRUCTURES: With respect to the anterolateral left seventh rib, there is diminishing FDG activity and no developing osteolytic process. Prior SUV max was 3.1 and is now 1.2. There is also no evidence of abnormal FDG activity in the region of the right lateral scapula. Very subtle area of sclerosis in the scapula on image 83 is unchanged and is not FDG avid. CT images: Overall stable     Impression: 1. There is no evidence of FDG avid soft tissue metastatic disease. 2. Diminishing activity associated with the anterolateral left seventh rib, nonspecific but most suggestive of a healing fracture. No abnormal activity seen along the right lateral margin of the scapula or in the region of the very subtle area of sclerosis. 3. No new osseous findings Workstation performed: [id number]      21 minutes total time spent on 9/17/2024 in caring for this patient including obtaining/reviewing history, symptom  "assessment and management, medication review / adjustment, psychosocial support, reviewing / ordering tests, medicines, imaging, procedures, medical marijuana, supportive listening, anticipatory guidance, and documenting in the medical record. All patient/family questions were answered during this discussion.    Gideon Smith MD  [facility]  [phone]    Portions of this document may have been created using dictation software and as such some \"sound alike\" terms may have been generated by the system. Do not hesitate to contact me with any questions or clarifications.   VIRTUAL VISIT DISCLAIMER: Magui Wren acknowledges that she has consented to an online visit or consultation. She understands that the online visit is based solely on information provided by her, and that, in the absence of a face-to-face physical evaluation by the physician, the diagnosis she receives is both limited and provisional in terms of accuracy and completeness. This is not intended to replace a full medical face-to-face evaluation by the physician. Magui Wren understands and accepts these terms.  "

## 2024-09-19 DIAGNOSIS — G89.3 CANCER RELATED PAIN: ICD-10-CM

## 2024-09-19 DIAGNOSIS — C50.812 MALIGNANT NEOPLASM OF OVERLAPPING SITES OF BOTH BREASTS IN FEMALE, ESTROGEN RECEPTOR POSITIVE (HCC): ICD-10-CM

## 2024-09-19 DIAGNOSIS — F32.A DEPRESSION, UNSPECIFIED DEPRESSION TYPE: ICD-10-CM

## 2024-09-19 DIAGNOSIS — Z51.5 PALLIATIVE CARE PATIENT: ICD-10-CM

## 2024-09-19 DIAGNOSIS — C50.811 MALIGNANT NEOPLASM OF OVERLAPPING SITES OF BOTH BREASTS IN FEMALE, ESTROGEN RECEPTOR POSITIVE (HCC): ICD-10-CM

## 2024-09-19 DIAGNOSIS — Z17.0 MALIGNANT NEOPLASM OF OVERLAPPING SITES OF BOTH BREASTS IN FEMALE, ESTROGEN RECEPTOR POSITIVE (HCC): ICD-10-CM

## 2024-09-19 DIAGNOSIS — F41.9 ANXIETY: ICD-10-CM

## 2024-09-19 DIAGNOSIS — G47.00 INSOMNIA: ICD-10-CM

## 2024-09-19 DIAGNOSIS — G89.28 CHRONIC POST-OPERATIVE PAIN: ICD-10-CM

## 2024-09-19 RX ORDER — VENLAFAXINE HYDROCHLORIDE 150 MG/1
150 CAPSULE, EXTENDED RELEASE ORAL EVERY EVENING
Qty: 30 CAPSULE | Refills: 5 | Status: SHIPPED | OUTPATIENT
Start: 2024-09-19

## 2024-09-19 NOTE — TELEPHONE ENCOUNTER
Last appointment:5/1/24    Next scheduled appointment:11/11/24    Pharmacy:[facility]

## 2024-10-03 ENCOUNTER — TELEPHONE (OUTPATIENT)
Age: 54
End: 2024-10-03

## 2024-10-03 NOTE — TELEPHONE ENCOUNTER
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  Pt stated she needs this sent ASAP she is currently at work.    She provided an email address and requested for attention to Neva for the letter to be sent to:    [email or web address]    Also she would like this CC to her as well through [employer].    Pt can be reached at [phone]. Thank you!

## 2024-10-04 ENCOUNTER — PATIENT MESSAGE (OUTPATIENT)
Dept: FAMILY MEDICINE CLINIC | Facility: CLINIC | Age: 54
End: 2024-10-04

## 2024-10-07 DIAGNOSIS — Z17.0 MALIGNANT NEOPLASM OF OVERLAPPING SITES OF BOTH BREASTS IN FEMALE, ESTROGEN RECEPTOR POSITIVE (HCC): ICD-10-CM

## 2024-10-07 DIAGNOSIS — G89.28 CHRONIC POST-OPERATIVE PAIN: ICD-10-CM

## 2024-10-07 DIAGNOSIS — F32.A DEPRESSION, UNSPECIFIED DEPRESSION TYPE: ICD-10-CM

## 2024-10-07 DIAGNOSIS — C50.811 MALIGNANT NEOPLASM OF OVERLAPPING SITES OF BOTH BREASTS IN FEMALE, ESTROGEN RECEPTOR POSITIVE (HCC): ICD-10-CM

## 2024-10-07 DIAGNOSIS — Z51.5 PALLIATIVE CARE PATIENT: ICD-10-CM

## 2024-10-07 DIAGNOSIS — G47.00 INSOMNIA: ICD-10-CM

## 2024-10-07 DIAGNOSIS — F41.9 ANXIETY: ICD-10-CM

## 2024-10-07 DIAGNOSIS — G89.3 CANCER RELATED PAIN: ICD-10-CM

## 2024-10-07 DIAGNOSIS — C50.812 MALIGNANT NEOPLASM OF OVERLAPPING SITES OF BOTH BREASTS IN FEMALE, ESTROGEN RECEPTOR POSITIVE (HCC): ICD-10-CM

## 2024-10-07 RX ORDER — VENLAFAXINE HYDROCHLORIDE 150 MG/1
150 CAPSULE, EXTENDED RELEASE ORAL EVERY EVENING
Qty: 30 CAPSULE | Refills: 5 | Status: SHIPPED | OUTPATIENT
Start: 2024-10-07

## 2024-10-19 DIAGNOSIS — I10 PRIMARY HYPERTENSION: ICD-10-CM

## 2024-10-19 RX ORDER — LISINOPRIL 20 MG/1
20 TABLET ORAL DAILY
Qty: 90 TABLET | Refills: 1 | Status: SHIPPED | OUTPATIENT
Start: 2024-10-19

## 2024-11-11 ENCOUNTER — OFFICE VISIT (OUTPATIENT)
Dept: PALLIATIVE MEDICINE | Facility: CLINIC | Age: 54
End: 2024-11-11
Payer: COMMERCIAL

## 2024-11-11 ENCOUNTER — APPOINTMENT (OUTPATIENT)
Dept: LAB | Facility: CLINIC | Age: 54
End: 2024-11-11
Payer: COMMERCIAL

## 2024-11-11 VITALS
RESPIRATION RATE: 20 BRPM | OXYGEN SATURATION: 97 % | DIASTOLIC BLOOD PRESSURE: 70 MMHG | SYSTOLIC BLOOD PRESSURE: 140 MMHG | HEART RATE: 77 BPM | TEMPERATURE: 98 F | BODY MASS INDEX: 32.18 KG/M2 | WEIGHT: 205.47 LBS

## 2024-11-11 DIAGNOSIS — Z79.899 MEDICAL MARIJUANA USE: ICD-10-CM

## 2024-11-11 DIAGNOSIS — G89.3 CANCER RELATED PAIN: ICD-10-CM

## 2024-11-11 DIAGNOSIS — R94.8 ABNORMAL POSITRON EMISSION TOMOGRAPHY (PET) SCAN: ICD-10-CM

## 2024-11-11 DIAGNOSIS — G47.00 INSOMNIA: ICD-10-CM

## 2024-11-11 DIAGNOSIS — I10 PRIMARY HYPERTENSION: ICD-10-CM

## 2024-11-11 DIAGNOSIS — C50.811 MALIGNANT NEOPLASM OF OVERLAPPING SITES OF BOTH BREASTS IN FEMALE, ESTROGEN RECEPTOR POSITIVE (HCC): ICD-10-CM

## 2024-11-11 DIAGNOSIS — F41.9 ANXIETY: ICD-10-CM

## 2024-11-11 DIAGNOSIS — C50.811 MALIGNANT NEOPLASM OF OVERLAPPING SITES OF BOTH BREASTS IN FEMALE, ESTROGEN RECEPTOR POSITIVE (HCC): Primary | ICD-10-CM

## 2024-11-11 DIAGNOSIS — Z51.5 PALLIATIVE CARE PATIENT: ICD-10-CM

## 2024-11-11 DIAGNOSIS — C50.812 MALIGNANT NEOPLASM OF OVERLAPPING SITES OF BOTH BREASTS IN FEMALE, ESTROGEN RECEPTOR POSITIVE (HCC): Primary | ICD-10-CM

## 2024-11-11 DIAGNOSIS — R59.0 CERVICAL LYMPHADENOPATHY: ICD-10-CM

## 2024-11-11 DIAGNOSIS — C50.812 MALIGNANT NEOPLASM OF OVERLAPPING SITES OF BOTH BREASTS IN FEMALE, ESTROGEN RECEPTOR POSITIVE (HCC): ICD-10-CM

## 2024-11-11 DIAGNOSIS — Z17.0 MALIGNANT NEOPLASM OF OVERLAPPING SITES OF BOTH BREASTS IN FEMALE, ESTROGEN RECEPTOR POSITIVE (HCC): ICD-10-CM

## 2024-11-11 DIAGNOSIS — Z17.0 MALIGNANT NEOPLASM OF OVERLAPPING SITES OF BOTH BREASTS IN FEMALE, ESTROGEN RECEPTOR POSITIVE (HCC): Primary | ICD-10-CM

## 2024-11-11 DIAGNOSIS — R94.8 ABNORMAL RADIONUCLIDE BONE SCAN: ICD-10-CM

## 2024-11-11 DIAGNOSIS — F32.A DEPRESSION, UNSPECIFIED DEPRESSION TYPE: ICD-10-CM

## 2024-11-11 LAB
ALBUMIN SERPL BCG-MCNC: 4.3 G/DL (ref 3.5–5)
ALP SERPL-CCNC: 76 U/L (ref 34–104)
ALT SERPL W P-5'-P-CCNC: 13 U/L (ref 7–52)
ANION GAP SERPL CALCULATED.3IONS-SCNC: 5 MMOL/L (ref 4–13)
AST SERPL W P-5'-P-CCNC: 15 U/L (ref 13–39)
BASOPHILS # BLD AUTO: 0.06 THOUSANDS/ÂΜL (ref 0–0.1)
BASOPHILS NFR BLD AUTO: 1 % (ref 0–1)
BILIRUB SERPL-MCNC: 0.24 MG/DL (ref 0.2–1)
BUN SERPL-MCNC: 12 MG/DL (ref 5–25)
CALCIUM SERPL-MCNC: 9.4 MG/DL (ref 8.4–10.2)
CHLORIDE SERPL-SCNC: 104 MMOL/L (ref 96–108)
CHOLEST SERPL-MCNC: 184 MG/DL
CO2 SERPL-SCNC: 32 MMOL/L (ref 21–32)
CREAT SERPL-MCNC: 0.73 MG/DL (ref 0.6–1.3)
EOSINOPHIL # BLD AUTO: 0.22 THOUSAND/ÂΜL (ref 0–0.61)
EOSINOPHIL NFR BLD AUTO: 2 % (ref 0–6)
ERYTHROCYTE [DISTWIDTH] IN BLOOD BY AUTOMATED COUNT: 12.7 % (ref 11.6–15.1)
GFR SERPL CREATININE-BSD FRML MDRD: 94 ML/MIN/1.73SQ M
GLUCOSE P FAST SERPL-MCNC: 93 MG/DL (ref 65–99)
HCT VFR BLD AUTO: 42.6 % (ref 34.8–46.1)
HDLC SERPL-MCNC: 52 MG/DL
HGB BLD-MCNC: 13.3 G/DL (ref 11.5–15.4)
IMM GRANULOCYTES # BLD AUTO: 0.03 THOUSAND/UL (ref 0–0.2)
IMM GRANULOCYTES NFR BLD AUTO: 0 % (ref 0–2)
LDLC SERPL CALC-MCNC: 83 MG/DL (ref 0–100)
LYMPHOCYTES # BLD AUTO: 2.93 THOUSANDS/ÂΜL (ref 0.6–4.47)
LYMPHOCYTES NFR BLD AUTO: 32 % (ref 14–44)
MCH RBC QN AUTO: 29.2 PG (ref 26.8–34.3)
MCHC RBC AUTO-ENTMCNC: 31.2 G/DL (ref 31.4–37.4)
MCV RBC AUTO: 93 FL (ref 82–98)
MONOCYTES # BLD AUTO: 0.86 THOUSAND/ÂΜL (ref 0.17–1.22)
MONOCYTES NFR BLD AUTO: 10 % (ref 4–12)
NEUTROPHILS # BLD AUTO: 4.96 THOUSANDS/ÂΜL (ref 1.85–7.62)
NEUTS SEG NFR BLD AUTO: 55 % (ref 43–75)
NONHDLC SERPL-MCNC: 132 MG/DL
NRBC BLD AUTO-RTO: 0 /100 WBCS
PLATELET # BLD AUTO: 336 THOUSANDS/UL (ref 149–390)
PMV BLD AUTO: 8.9 FL (ref 8.9–12.7)
POTASSIUM SERPL-SCNC: 4.2 MMOL/L (ref 3.5–5.3)
PROT SERPL-MCNC: 7.3 G/DL (ref 6.4–8.4)
RBC # BLD AUTO: 4.56 MILLION/UL (ref 3.81–5.12)
SODIUM SERPL-SCNC: 141 MMOL/L (ref 135–147)
TRIGL SERPL-MCNC: 246 MG/DL
TSH SERPL DL<=0.05 MIU/L-ACNC: 2.08 UIU/ML (ref 0.45–4.5)
WBC # BLD AUTO: 9.06 THOUSAND/UL (ref 4.31–10.16)

## 2024-11-11 PROCEDURE — 80053 COMPREHEN METABOLIC PANEL: CPT

## 2024-11-11 PROCEDURE — 86300 IMMUNOASSAY TUMOR CA 15-3: CPT

## 2024-11-11 PROCEDURE — 84443 ASSAY THYROID STIM HORMONE: CPT

## 2024-11-11 PROCEDURE — 36415 COLL VENOUS BLD VENIPUNCTURE: CPT

## 2024-11-11 PROCEDURE — 99213 OFFICE O/P EST LOW 20 MIN: CPT | Performed by: INTERNAL MEDICINE

## 2024-11-11 PROCEDURE — 80061 LIPID PANEL: CPT

## 2024-11-11 PROCEDURE — 85025 COMPLETE CBC W/AUTO DIFF WBC: CPT

## 2024-11-11 NOTE — ASSESSMENT & PLAN NOTE
Time spent with patient providing supportive listening.   All questions and concerns were addressed to their satisfaction.    RTC: 1 year for MMJ recertification

## 2024-11-11 NOTE — PROGRESS NOTES
Ambulatory Visit  Name: Magui Wren      : 1970      MRN: [id number]  Encounter Provider: Karen Cummings DO  Encounter Date: 2024   Encounter department: [facility] PALLI[facility] [place]    Assessment & Plan  Depression, unspecified depression type  Continue Effexor       Anxiety         Medical marijuana use  Continues to get good symptom relief with MMJ products         Palliative care patient  Time spent with patient providing supportive listening.   All questions and concerns were addressed to their satisfaction.    RTC: 1 year for MMJ recertification         Malignant neoplasm of overlapping sites of both breasts in female, estrogen receptor positive (HCC)         Cancer related pain  Continue Gabapentin        Insomnia  Doing well with MMJ         Decisional apparatus: Patient is competent on my exam today. If competence is lost, patient's substitute decision maker would default to fiancee by [place] Act 169.   Advance Directive / Living Will / POLST: yes     PDMP Review: I have reviewed the patient's controlled substance dispensing history in the Prescription Drug Monitoring Program in compliance with the [facility] regulations before prescribing any controlled substances.    History of Present Illness     Magui Wren is a 54 y.o. female who presents in follow up for symptom related to breast cancer.     Patient continues to do very well.  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  Her only concern is some joint pains which she attributes to her new job but also to her weight.  We discussed talking with her PCP about different weight management strategies and briefly discussed medication management as well.  Otherwise, she is in good spirits she is engaged and will be  next fall.  She continues to use medical marijuana for insomnia.  The Effexor and gabapentin are continuing to manage her symptoms.  She does not need any refills at this time.  And offers no other complaints.    History  obtained from : patient  Review of Systems  Medical History Reviewed by provider this encounter:               Objective     Temp 98 °F (36.7 °C) (Temporal)     Physical Exam  Vitals and nursing note reviewed.   Constitutional:       General: She is not in acute distress.  HENT:      Head: Normocephalic and atraumatic.      Right Ear: External ear normal.      Left Ear: External ear normal.   Eyes:      General:         Right eye: No discharge.         Left eye: No discharge.   Cardiovascular:      Rate and Rhythm: Normal rate and regular rhythm.   Pulmonary:      Effort: Pulmonary effort is normal. No respiratory distress.   Abdominal:      General: There is no distension.      Palpations: Abdomen is soft.   Musculoskeletal:      Right lower leg: No edema.      Left lower leg: No edema.   Neurological:      General: No focal deficit present.      Mental Status: She is oriented to person, place, and time.   Psychiatric:         Mood and Affect: Mood normal.         Behavior: Behavior normal.         Thought Content: Thought content normal.         Judgment: Judgment normal.         Recent labs:  Lab Results   Component Value Date/Time    SODIUM 141 11/11/2024 06:34 AM    K 4.2 11/11/2024 06:34 AM    BUN 12 11/11/2024 06:34 AM    CREATININE 0.73 11/11/2024 06:34 AM    GLUC 142 (H) 06/21/2024 09:59 AM    CALCIUM 9.4 11/11/2024 06:34 AM    AST 15 11/11/2024 06:34 AM    ALT 13 11/11/2024 06:34 AM    ALB 4.3 11/11/2024 06:34 AM    TP 7.3 11/11/2024 06:34 AM    EGFR 94 11/11/2024 06:34 AM     Lab Results   Component Value Date/Time    HGB 13.3 11/11/2024 06:34 AM    WBC 9.06 11/11/2024 06:34 AM     11/11/2024 06:34 AM     Lab Results   Component Value Date/Time    [id number] 0.782 11/22/2023 07:00 AM       Recent Imaging:  Procedure: NM PET CT skull base to mid thigh    Result Date: 8/27/2024  Narrative: PET/CT SCAN INDICATION: C50.811: Malignant neoplasm of overlapping sites of right female breast C50.812:  Malignant neoplasm of overlapping sites of left female breast Z17.0: Estrogen receptor positive status (ER+) R94.8: Abnormal results of function studies of other organs and systems R59.0: Localized enlarged lymph nodes Bilateral breast cancer. Continuation of care. MODIFIER: PS COMPARISON: Prior PET/CT 3/4/2024. CELL TYPE: Bilateral breast ductal carcinoma TECHNIQUE:   10.6 mCi F-18-FDG administered IV. Multiplanar attenuation corrected and non attenuation corrected PET images are available for interpretation, and contiguous, low dose, axial CT sections were obtained from the skull base through the femurs.  Intravenous contrast material was not utilized. This examination, like all CT scans performed in the [facility], was performed utilizing techniques to minimize radiation dose exposure, including the use of iterative reconstruction and automated exposure control. Fasting serum glucose: 97 mg/dl FINDINGS: All images refer to series 4 unless otherwise specified. VISUALIZED BRAIN: No acute abnormalities are seen. HEAD/NECK: No FDG avid findings suspicious for pathologic cervical adenopathy or other form of soft tissue metastasis. Prior study noted a right level 2 lymph node with an SUV max of 2.8 and short axis diameter of 7 mm. This is seen currently on image 47, unchanged  in size, SUV max now 1.6. CT images: Stable CHEST: No FDG avid soft tissue lesions are seen. CT images: Coronary artery calcification. Bullous changes within the lungs. ABDOMEN: No FDG avid soft tissue lesions are seen. CT images: Hepatic steatosis. Nonobstructing right renal calculus. Small hiatal hernia. PELVIS: No FDG avid soft tissue lesions are seen. Activity in the region of the perineum is most likely radioactive urine refluxing into the vaginal fornices. CT images: Stable appearance of the uterus. Scattered colonic diverticula. OSSEOUS STRUCTURES: With respect to the anterolateral left seventh rib, there is diminishing  FDG activity and no developing osteolytic process. Prior SUV max was 3.1 and is now 1.2. There is also no evidence of abnormal FDG activity in the region of the right lateral scapula. Very subtle area of sclerosis in the scapula on image 83 is unchanged and is not FDG avid. CT images: Overall stable     Impression: 1. There is no evidence of FDG avid soft tissue metastatic disease. 2. Diminishing activity associated with the anterolateral left seventh rib, nonspecific but most suggestive of a healing fracture. No abnormal activity seen along the right lateral margin of the scapula or in the region of the very subtle area of sclerosis. 3. No new osseous findings Workstation performed: [id number]      Administrative Statements   I have spent a total time of 20 minutes in caring for this patient on the day of the visit/encounter including Risks and benefits of tx options, Instructions for management, Patient and family education, Importance of tx compliance, Risk factor reductions, Impressions, Counseling / Coordination of care, Documenting in the medical record, Reviewing / ordering tests, medicine, procedures  , Obtaining or reviewing history  , and Communicating with other healthcare professionals . Topics discussed with the patient / family include symptom assessment and management, medication review, psychosocial support, medical marijuana, and supportive listening.

## 2024-11-12 LAB — CANCER AG27-29 SERPL-ACNC: 23.1 U/ML (ref 0–38.6)

## 2024-11-13 ENCOUNTER — OFFICE VISIT (OUTPATIENT)
Dept: FAMILY MEDICINE CLINIC | Facility: CLINIC | Age: 54
End: 2024-11-13
Payer: COMMERCIAL

## 2024-11-13 VITALS
WEIGHT: 201.6 LBS | SYSTOLIC BLOOD PRESSURE: 118 MMHG | OXYGEN SATURATION: 96 % | HEART RATE: 93 BPM | TEMPERATURE: 97.6 F | DIASTOLIC BLOOD PRESSURE: 72 MMHG | RESPIRATION RATE: 16 BRPM | HEIGHT: 67 IN | BODY MASS INDEX: 31.64 KG/M2

## 2024-11-13 DIAGNOSIS — Z17.0 MALIGNANT NEOPLASM OF OVERLAPPING SITES OF BOTH BREASTS IN FEMALE, ESTROGEN RECEPTOR POSITIVE (HCC): ICD-10-CM

## 2024-11-13 DIAGNOSIS — E66.811 CLASS 1 OBESITY WITHOUT SERIOUS COMORBIDITY WITH BODY MASS INDEX (BMI) OF 31.0 TO 31.9 IN ADULT, UNSPECIFIED OBESITY TYPE: ICD-10-CM

## 2024-11-13 DIAGNOSIS — Z23 ENCOUNTER FOR IMMUNIZATION: ICD-10-CM

## 2024-11-13 DIAGNOSIS — C50.811 MALIGNANT NEOPLASM OF OVERLAPPING SITES OF BOTH BREASTS IN FEMALE, ESTROGEN RECEPTOR POSITIVE (HCC): ICD-10-CM

## 2024-11-13 DIAGNOSIS — I10 PRIMARY HYPERTENSION: Primary | ICD-10-CM

## 2024-11-13 DIAGNOSIS — C50.812 MALIGNANT NEOPLASM OF OVERLAPPING SITES OF BOTH BREASTS IN FEMALE, ESTROGEN RECEPTOR POSITIVE (HCC): ICD-10-CM

## 2024-11-13 PROCEDURE — 90471 IMMUNIZATION ADMIN: CPT | Performed by: FAMILY MEDICINE

## 2024-11-13 PROCEDURE — 99214 OFFICE O/P EST MOD 30 MIN: CPT | Performed by: FAMILY MEDICINE

## 2024-11-13 PROCEDURE — 90673 RIV3 VACCINE NO PRESERV IM: CPT | Performed by: FAMILY MEDICINE

## 2024-11-13 NOTE — TELEPHONE ENCOUNTER
Pt had appt on 11/13/24 and left without checking out.    Return in about 4 months (around 3/13/2025).     Called pt lm to call back and schedule

## 2024-11-13 NOTE — PATIENT INSTRUCTIONS
Start wegovy weekly.  After the 3rd shot, message me  refer to nutrition  Continue current meds  Nurse visit for shingrix

## 2024-11-13 NOTE — PROGRESS NOTES
Ambulatory Visit  Name: Magui Wren      : 1970      MRN: [id number]  Encounter Provider: Nati Garcia DO  Encounter Date: 2024   Encounter department: [facility]    Assessment & Plan  Primary hypertension  At goal on current meds  Recheck 6 mo       Malignant neoplasm of overlapping sites of both breasts in female, estrogen receptor positive (HCC)  Continues on tamoxifen  Following with h/o and palliative care         Class 1 obesity without serious comorbidity with body mass index (BMI) of 31.0 to 31.9 in adult, unspecified obesity type  Prior Authorization Clinical Questions for Weight Management Pharmacotherapy    1. Does the patient have a contrainidcation to medication prescribed for weight management?: No  2. Does the patient have a diagnosis of obesity, confirmed by a BMI greater than or equal to 30 kg/m^2?: Yes  3. Does the patient have a BMI of greater than or equal to 27 kg/m^2 with at least one weight-related comorbidity/risk factor/complication (e.g. diabetes, dyslipidemia, coronary artery disease)?: Yes  5. Has the patient been on a weight loss regimen of low-calorie diet, increased physical activity, and lifestyle modifications for a minimum of 6 months?: Yes  6. Has the patient completed a comprehensive weight loss program (ie, Weight Watchers, Noom, Bariatrics, other qian on phone)? If so, what?: No  7. Does the patient have a history of type 2 diabetes?: No  8. Has the member tried and failed other weight loss medication within the past 12 months?: No  9. Will the member use requested medication in combination with another GLP agonist or weight loss drug?: No  10. Is the medication a controlled substance?: No     Baseline weight (in pounds): 201 lbs     Discussed risks/benefits  Start wegovy 0.25mg  F/u 3 mo  Refer to nutrition    Orders:    Semaglutide-Weight Management (WEGOVY) 0.25 MG/0.5ML; Inject 0.5 mL (0.25 mg total) under the skin once a week     Ambulatory Referral to Nutrition Services; Future    Encounter for immunization    Orders:    influenza vaccine, recombinant, PF, 0.5 mL IM (Flublok)       History of Present Illness     HPI  Pt presents in f/u.  Most recent labs show:    Lab Results   Component Value Date    SODIUM 141 11/11/2024    K 4.2 11/11/2024     11/11/2024    CO2 32 11/11/2024    AGAP 5 11/11/2024    BUN 12 11/11/2024    CREATININE 0.73 11/11/2024    GLUC 142 (H) 06/21/2024    GLUF 93 11/11/2024    CALCIUM 9.4 11/11/2024    AST 15 11/11/2024    ALT 13 11/11/2024    ALKPHOS 76 11/11/2024    TP 7.3 11/11/2024    TBILI 0.24 11/11/2024    EGFR 94 11/11/2024     Lab Results   Component Value Date    CHOLESTEROL 184 11/11/2024    CHOLESTEROL 175 11/22/2023     Lab Results   Component Value Date    HDL 52 11/11/2024    HDL 59 11/22/2023     Lab Results   Component Value Date    TRIG 246 (H) 11/11/2024    TRIG 122 11/22/2023     Lab Results   Component Value Date    NONHDLC 132 11/11/2024    NONHDLC 116 11/22/2023     Lab Results   Component Value Date    LDLCALC 83 11/11/2024     Lab Results   Component Value Date    WBC 9.06 11/11/2024    HGB 13.3 11/11/2024    HCT 42.6 11/11/2024    MCV 93 11/11/2024     11/11/2024     Lab Results   Component Value Date    [id number] 2.080 11/11/2024     10 yr risk 1.8%    Doing well in general.  Following with h/o and palliative care.    Blood pressure at goal.  No chest pain, shortness of breath, n/v, abd pain, visual changes, paresthesias, weakness, cough.    Pt interested in weight loss.  Has tried multiple diet and exercise plans with no improvement.        Review of Systems   Constitutional:  Negative for chills, fatigue, fever and unexpected weight change.   HENT:  Negative for congestion, ear pain, hearing loss, postnasal drip, rhinorrhea, sinus pressure, sinus pain, sore throat, trouble swallowing and voice change.    Eyes:  Negative for pain, redness and visual disturbance.  "  Respiratory:  Negative for cough and shortness of breath.    Cardiovascular:  Negative for chest pain, palpitations and leg swelling.   Gastrointestinal:  Negative for abdominal pain, constipation, diarrhea and nausea.   Endocrine: Negative for cold intolerance, heat intolerance, polydipsia and polyuria.   Genitourinary:  Negative for dysuria, frequency and urgency.   Musculoskeletal:  Positive for arthralgias. Negative for joint swelling and myalgias.   Skin:  Negative for rash.        No suspicious lesions   Neurological:  Negative for weakness, numbness and headaches.   Hematological:  Negative for adenopathy.             Objective     /72 (BP Location: Left arm, Patient Position: Sitting, Cuff Size: Standard)   Pulse 93   Temp 97.6 °F (36.4 °C)   Resp 16   Ht 5' 7\" (1.702 m)   Wt 91.4 kg (201 lb 9.6 oz)   SpO2 96%   BMI 31.58 kg/m²     Physical Exam  Constitutional:       Appearance: Normal appearance.   HENT:      Head: Normocephalic and atraumatic.      Right Ear: Tympanic membrane, ear canal and external ear normal.      Left Ear: Tympanic membrane, ear canal and external ear normal.      Nose: Nose normal. No congestion.      Mouth/Throat:      Mouth: Mucous membranes are moist.      Pharynx: No oropharyngeal exudate or posterior oropharyngeal erythema.   Eyes:      Extraocular Movements: Extraocular movements intact.      Conjunctiva/sclera: Conjunctivae normal.      Pupils: Pupils are equal, round, and reactive to light.   Neck:      Vascular: No carotid bruit.   Cardiovascular:      Rate and Rhythm: Normal rate and regular rhythm.      Heart sounds: No murmur heard.     No friction rub. No gallop.   Pulmonary:      Effort: Pulmonary effort is normal.      Breath sounds: No wheezing, rhonchi or rales.   Abdominal:      General: Abdomen is flat. There is no distension.      Palpations: Abdomen is soft.      Tenderness: There is no abdominal tenderness.   Musculoskeletal:      Cervical back: " Neck supple.   Lymphadenopathy:      Cervical: No cervical adenopathy.   Neurological:      General: No focal deficit present.      Mental Status: She is alert and oriented to person, place, and time.      Cranial Nerves: No cranial nerve deficit.      Motor: No weakness.      Deep Tendon Reflexes: Reflexes normal.

## 2024-11-14 ENCOUNTER — TELEPHONE (OUTPATIENT)
Dept: FAMILY MEDICINE CLINIC | Facility: CLINIC | Age: 54
End: 2024-11-14

## 2024-11-14 DIAGNOSIS — E66.811 CLASS 1 OBESITY WITHOUT SERIOUS COMORBIDITY WITH BODY MASS INDEX (BMI) OF 31.0 TO 31.9 IN ADULT, UNSPECIFIED OBESITY TYPE: ICD-10-CM

## 2024-11-14 RX ORDER — SEMAGLUTIDE 0.25 MG/.5ML
INJECTION, SOLUTION SUBCUTANEOUS
Refills: 0 | OUTPATIENT
Start: 2024-11-14

## 2024-11-14 NOTE — TELEPHONE ENCOUNTER
Reason for call:   [x] Prior Auth  [] Other:     Medication: (WEGOVY) 0.25 MG/0.5ML     Dose/Frequency:  Inject 0.5 mL (0.25 mg total) under the skin once a week     Quantity: 2 mL     Ordering Provider:   [x] PCP/Provider - Nati Garcia DO   [] Speciality/Provider -

## 2024-11-15 NOTE — TELEPHONE ENCOUNTER
PA for Wegovy 0.25 mg/0.5 ml SUBMITTED to [facility] received 11/13    via    [x]CMM-KEY: [id number]  []Surescripts-Case ID #   []Availity-Auth ID # NDC #   []Faxed to plan   []Other website   []Phone call Case ID #     []PA sent as URGENT    All office notes, labs and other pertaining documents and studies sent. Clinical questions answered. Awaiting determination from insurance company.     Turnaround time for your insurance to make a decision on your Prior Authorization can take 7-21 business days.

## 2024-11-18 DIAGNOSIS — Z51.5 PALLIATIVE CARE PATIENT: ICD-10-CM

## 2024-11-18 DIAGNOSIS — G47.00 INSOMNIA: ICD-10-CM

## 2024-11-18 DIAGNOSIS — F41.9 ANXIOUSNESS: ICD-10-CM

## 2024-11-18 DIAGNOSIS — Z17.0 MALIGNANT NEOPLASM OF OVERLAPPING SITES OF BOTH BREASTS IN FEMALE, ESTROGEN RECEPTOR POSITIVE (HCC): ICD-10-CM

## 2024-11-18 DIAGNOSIS — C50.811 MALIGNANT NEOPLASM OF OVERLAPPING SITES OF BOTH BREASTS IN FEMALE, ESTROGEN RECEPTOR POSITIVE (HCC): ICD-10-CM

## 2024-11-18 DIAGNOSIS — G89.3 CANCER RELATED PAIN: ICD-10-CM

## 2024-11-18 DIAGNOSIS — C50.812 MALIGNANT NEOPLASM OF OVERLAPPING SITES OF BOTH BREASTS IN FEMALE, ESTROGEN RECEPTOR POSITIVE (HCC): ICD-10-CM

## 2024-11-18 DIAGNOSIS — F32.A DEPRESSION: ICD-10-CM

## 2024-11-18 DIAGNOSIS — G89.28 CHRONIC POST-OPERATIVE PAIN: ICD-10-CM

## 2024-11-18 RX ORDER — GABAPENTIN 300 MG/1
CAPSULE ORAL
Qty: 360 CAPSULE | Refills: 0 | Status: SHIPPED | OUTPATIENT
Start: 2024-11-18

## 2024-11-18 NOTE — TELEPHONE ENCOUNTER
Last appointment:11//11/24    Next scheduled appointment:9/15/25    Pharmacy: [facility]

## 2024-11-18 NOTE — TELEPHONE ENCOUNTER
PA for wegovy 0.25 mg/0.5 ml  EXCLUDED from plan       Reason:(Screenshot if applicable)        Message sent to office clinical pool Yes

## 2024-11-18 NOTE — TELEPHONE ENCOUNTER
Patient called in stating pharmacy let her know they did not have wegovy or alternative for her. Patient asked what else she could try. Patient asked if we could leave her a message as she is sleeping before she goes to work tonight.    Please advise patient, thank you

## 2024-11-20 NOTE — TELEPHONE ENCOUNTER
Patient called because she is aware of the denial for the Wegovy through her insurance but she said she does not want to go through her insurance anymore and she will pay out of pocket because she wants this medication. She said she would like the script for the Wegovy sent to the [facility] on [address] in [place]. Please advise. Thank you.

## 2024-11-20 NOTE — TELEPHONE ENCOUNTER
Patient was made aware of denial and will call her insurance to find out what weight loss medications are covered under her plan. No further assistance needed at this time.

## 2024-11-21 NOTE — TELEPHONE ENCOUNTER
I don't generally give other weight loss meds, but if she wants, I can refer her to weigh mgmt who works with diet/exercise and sometimes those other meds when it's appropriate.  Does she want a referral there?

## 2024-11-25 ENCOUNTER — TELEPHONE (OUTPATIENT)
Dept: FAMILY MEDICINE CLINIC | Facility: CLINIC | Age: 54
End: 2024-11-25

## 2024-11-25 DIAGNOSIS — E66.811 CLASS 1 OBESITY WITHOUT SERIOUS COMORBIDITY WITH BODY MASS INDEX (BMI) OF 31.0 TO 31.9 IN ADULT, UNSPECIFIED OBESITY TYPE: Primary | ICD-10-CM

## 2024-11-25 NOTE — TELEPHONE ENCOUNTER
Addended by: FEROZ HILL on: 3/23/2022 11:59 AM     Modules accepted: Orders     Called pt lm to call back and let us know if she wants the weight management contact info and if she does Dr Garcia has to put the referral in. Please cancel her appt once she calls back. See Dr Garcia's note from below.

## 2024-11-25 NOTE — TELEPHONE ENCOUNTER
----- Message from Nati Garcia, DO sent at 11/25/2024  8:27 AM EST -----  Regarding: tomorrow's appt  Noe de la torre!    This patient is on my schedule for tomorrow about weight loss meds.  I have a message in the chart from her that says her insurance doesn't cover wegovy or zepbound.  I don't generally give other weight loss meds and sent a message to her that said I would recommend seeing the weight mgmt folks who know more about and sometimes use other meds.  Not sure if she got that info, though.  Can you call her, give her that info, and if she wants me to place a referral, I can just do that without her coming in.  If that is the case, can we extend sesar jenkins's appt and do a physical and f/u on her.    Thanks!    natalia

## 2024-12-02 DIAGNOSIS — E66.811 CLASS 1 OBESITY WITHOUT SERIOUS COMORBIDITY WITH BODY MASS INDEX (BMI) OF 31.0 TO 31.9 IN ADULT, UNSPECIFIED OBESITY TYPE: ICD-10-CM

## 2024-12-03 DIAGNOSIS — F32.A DEPRESSION: ICD-10-CM

## 2024-12-03 DIAGNOSIS — C50.811 MALIGNANT NEOPLASM OF OVERLAPPING SITES OF BOTH BREASTS IN FEMALE, ESTROGEN RECEPTOR POSITIVE (HCC): ICD-10-CM

## 2024-12-03 DIAGNOSIS — G89.3 CANCER RELATED PAIN: ICD-10-CM

## 2024-12-03 DIAGNOSIS — G47.00 INSOMNIA: ICD-10-CM

## 2024-12-03 DIAGNOSIS — G89.28 CHRONIC POST-OPERATIVE PAIN: ICD-10-CM

## 2024-12-03 DIAGNOSIS — Z51.5 PALLIATIVE CARE PATIENT: ICD-10-CM

## 2024-12-03 DIAGNOSIS — Z17.0 MALIGNANT NEOPLASM OF OVERLAPPING SITES OF BOTH BREASTS IN FEMALE, ESTROGEN RECEPTOR POSITIVE (HCC): ICD-10-CM

## 2024-12-03 DIAGNOSIS — F41.9 ANXIOUSNESS: ICD-10-CM

## 2024-12-03 DIAGNOSIS — C50.812 MALIGNANT NEOPLASM OF OVERLAPPING SITES OF BOTH BREASTS IN FEMALE, ESTROGEN RECEPTOR POSITIVE (HCC): ICD-10-CM

## 2024-12-04 RX ORDER — GABAPENTIN 300 MG/1
CAPSULE ORAL
Qty: 360 CAPSULE | Refills: 1 | Status: SHIPPED | OUTPATIENT
Start: 2024-12-04

## 2024-12-10 ENCOUNTER — TELEPHONE (OUTPATIENT)
Dept: PALLIATIVE MEDICINE | Facility: CLINIC | Age: 54
End: 2024-12-10

## 2024-12-10 RX ORDER — SEMAGLUTIDE 1 MG/.5ML
INJECTION, SOLUTION SUBCUTANEOUS
Qty: 2 ML | Refills: 0 | Status: SHIPPED | OUTPATIENT
Start: 2024-12-10

## 2024-12-10 NOTE — TELEPHONE ENCOUNTER
Patient called status of medication gabapentin (NEURONTIN) 300 mg capsule . Informed patient that she requested on the 3rd and the script was sent on 12/04/2024 at 11:08 am. Informed patient to speak to someone live from the pharmacy. Patient stated that she would and verbalized understanding.

## 2024-12-16 ENCOUNTER — TELEPHONE (OUTPATIENT)
Dept: BARIATRICS | Facility: CLINIC | Age: 54
End: 2024-12-16

## 2025-01-21 ENCOUNTER — APPOINTMENT (OUTPATIENT)
Dept: RADIOLOGY | Facility: CLINIC | Age: 55
End: 2025-01-21

## 2025-01-21 ENCOUNTER — APPOINTMENT (OUTPATIENT)
Dept: URGENT CARE | Facility: CLINIC | Age: 55
End: 2025-01-21

## 2025-01-21 DIAGNOSIS — Z02.1 PHYSICAL EXAM, PRE-EMPLOYMENT: ICD-10-CM

## 2025-01-21 PROCEDURE — 71045 X-RAY EXAM CHEST 1 VIEW: CPT

## 2025-02-17 ENCOUNTER — TELEPHONE (OUTPATIENT)
Dept: PALLIATIVE MEDICINE | Facility: CLINIC | Age: 55
End: 2025-02-17

## 2025-02-17 NOTE — TELEPHONE ENCOUNTER
Left message to call us back so we can help her reschedule her appt on 9/15/25 due to provider is not available.

## 2025-03-18 DIAGNOSIS — Z51.5 PALLIATIVE CARE PATIENT: ICD-10-CM

## 2025-03-18 DIAGNOSIS — F41.9 ANXIETY: ICD-10-CM

## 2025-03-18 DIAGNOSIS — C50.812 MALIGNANT NEOPLASM OF OVERLAPPING SITES OF BOTH BREASTS IN FEMALE, ESTROGEN RECEPTOR POSITIVE (HCC): ICD-10-CM

## 2025-03-18 DIAGNOSIS — G89.28 CHRONIC POST-OPERATIVE PAIN: ICD-10-CM

## 2025-03-18 DIAGNOSIS — Z17.0 MALIGNANT NEOPLASM OF OVERLAPPING SITES OF BOTH BREASTS IN FEMALE, ESTROGEN RECEPTOR POSITIVE (HCC): ICD-10-CM

## 2025-03-18 DIAGNOSIS — G47.00 INSOMNIA: ICD-10-CM

## 2025-03-18 DIAGNOSIS — C50.811 MALIGNANT NEOPLASM OF OVERLAPPING SITES OF BOTH BREASTS IN FEMALE, ESTROGEN RECEPTOR POSITIVE (HCC): ICD-10-CM

## 2025-03-18 DIAGNOSIS — G89.3 CANCER RELATED PAIN: ICD-10-CM

## 2025-03-18 DIAGNOSIS — F32.A DEPRESSION, UNSPECIFIED DEPRESSION TYPE: ICD-10-CM

## 2025-03-18 RX ORDER — VENLAFAXINE HYDROCHLORIDE 150 MG/1
150 CAPSULE, EXTENDED RELEASE ORAL EVERY EVENING
Qty: 30 CAPSULE | Refills: 5 | Status: SHIPPED | OUTPATIENT
Start: 2025-03-18

## 2025-04-03 ENCOUNTER — OFFICE VISIT (OUTPATIENT)
Dept: FAMILY MEDICINE CLINIC | Facility: CLINIC | Age: 55
End: 2025-04-03
Payer: COMMERCIAL

## 2025-04-03 VITALS
SYSTOLIC BLOOD PRESSURE: 108 MMHG | HEIGHT: 67 IN | BODY MASS INDEX: 32.33 KG/M2 | DIASTOLIC BLOOD PRESSURE: 72 MMHG | RESPIRATION RATE: 16 BRPM | WEIGHT: 206 LBS | TEMPERATURE: 98.7 F | HEART RATE: 111 BPM | OXYGEN SATURATION: 97 %

## 2025-04-03 DIAGNOSIS — R00.2 PALPITATIONS: ICD-10-CM

## 2025-04-03 DIAGNOSIS — R00.0 TACHYCARDIA: ICD-10-CM

## 2025-04-03 DIAGNOSIS — I10 PRIMARY HYPERTENSION: Primary | ICD-10-CM

## 2025-04-03 DIAGNOSIS — F32.A DEPRESSION, UNSPECIFIED DEPRESSION TYPE: ICD-10-CM

## 2025-04-03 DIAGNOSIS — R51.9 NONINTRACTABLE HEADACHE, UNSPECIFIED CHRONICITY PATTERN, UNSPECIFIED HEADACHE TYPE: ICD-10-CM

## 2025-04-03 DIAGNOSIS — R94.31 ABNORMAL EKG: ICD-10-CM

## 2025-04-03 PROCEDURE — 93000 ELECTROCARDIOGRAM COMPLETE: CPT | Performed by: FAMILY MEDICINE

## 2025-04-03 PROCEDURE — 99214 OFFICE O/P EST MOD 30 MIN: CPT | Performed by: FAMILY MEDICINE

## 2025-04-03 RX ORDER — ALBUTEROL SULFATE 90 UG/1
INHALANT RESPIRATORY (INHALATION)
COMMUNITY
Start: 2025-01-25

## 2025-04-03 NOTE — PROGRESS NOTES
Assessment/Plan:  Assessment & Plan  Primary hypertension  Stable.  Check blood pressure outside of office.  Recommend lifestyle modifications.    Orders:  •  POCT ECG  •  CBC and differential; Future  •  Comprehensive metabolic panel; Future  •  Magnesium; Future  •  Echo complete w/ contrast if indicated; Future    Palpitations    Stable EKG.  Pending labs and 24-hour Holter.      Sinus tachycardia at 102 bpm.  Normal axis.  Possible left atrial enlargement.  No acute ST elevation or depression.  No acute T wave inversion.  No prior EKG for comparison.    Orders:  •  POCT ECG  •  CBC and differential; Future  •  Comprehensive metabolic panel; Future  •  TSH, 3rd generation with Free T4 reflex; Future  •  Magnesium; Future  •  Holter monitor; Future  •  Echo complete w/ contrast if indicated; Future    Tachycardia    Stable EKG.  Pending labs and 24-hour Holter.      Sinus tachycardia at 102 bpm.  Normal axis.  Possible left atrial enlargement.  No acute ST elevation or depression.  No acute T wave inversion.  No prior EKG for comparison.    Orders:  •  POCT ECG  •  CBC and differential; Future  •  Comprehensive metabolic panel; Future  •  TSH, 3rd generation with Free T4 reflex; Future  •  Magnesium; Future  •  Holter monitor; Future  •  Echo complete w/ contrast if indicated; Future    Depression, unspecified depression type  Depression Screening Follow-up Plan: Patient's depression screening was positive with a PHQ-9 score of 9. Continue regular follow-up with their psychologist/therapist/psychiatrist who is managing their mental health condition(s).       Stable.  Management per Psych.  HA may benefit by increasing Effexor XR 150mg QD per Psych?      Nonintractable headache, unspecified chronicity pattern, unspecified headache type    HTN is non-contributory.  HA may benefit by increasing Effexor XR 150mg QD per Psych?      For headache and migraine prevention I would suggest a combination vitamin supplement  which may include 1 or more of the following ingredients: Magnesium citrate 300 mg twice a day, Riboflavin (vitamin B2) 400 - 600 mg,  Butterbur 150 mg (PA free). Other ingredients that may be helpful are feverfew, coenzyme Q10 100 mg three times a day,  melatonin and bandar.  Some example brands that combine some of these ingredients are Migravent or Dolovent.              Abnormal EKG    Pending Echo due to Possible left atrial enlargement.     Orders:  •  Echo complete w/ contrast if indicated; Future       Return in about 6 weeks (around 5/15/2025) for With PCP Dr. Garcia - Physical / F/U CC.      Future Appointments   Date Time Provider Department Center   2025  1:30 PM Alyse Martinez PA-C [facility]        Subjective:     Magui is a 54 y.o. female who presents today for a follow-up on her acute medical conditions.        HPI:  Chief Complaint   Patient presents with   • Blood Pressure Check     -- Above per clinical staff and reviewed. --    HPI      Today:      HTN - Elevated BP in afternoon and evening, x 1 month.  BP check at work with [employer] without resting prior -  140/100's, , 120.  She does not check BP check outside of office.  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  She has been hydrating well and pushing fluids.  She has a mild HA currently c /80, which will worsen at night.  She has had HA for 1-1.5 months, started new job 2 months ago.  She is on Effexor XR 150mg QHS for 1 year.          HTN management per PCP Dr. Garcia.  On Lisinopril 20mg QD.        PHQ-2/9 Depression Screening    Little interest or pleasure in doing things: 0 - not at all  Feeling down, depressed, or hopeless: 1 - several days  Trouble falling or staying asleep, or sleeping too much: 3 - nearly every day  Feeling tired or having little energy: 3 - nearly every day  Poor appetite or overeatin - not at all  Feeling bad  about yourself - or that you are a failure or have let yourself or your family down: 0 - not at all  Trouble concentrating on things, such as reading the newspaper or watching television: 1 - several days  Moving or speaking so slowly that other people could have noticed. Or the opposite - being so fidgety or restless that you have been moving around a lot more than usual: 1 - several days  Thoughts that you would be better off dead, or of hurting yourself in some way: 0 - not at all  PHQ-9 Score: 9  PHQ-9 Interpretation: Mild depression           The following portions of the patient's history were reviewed and updated as appropriate: allergies, current medications, past family history, past medical history, past social history, past surgical history and problem list.      Review of Systems   Constitutional:  Positive for fatigue. Negative for appetite change, chills, diaphoresis and fever.   Respiratory:  Positive for shortness of breath (s/p RTX to lung). Negative for chest tightness.    Cardiovascular:  Positive for palpitations (Occurs daily, lasting 5 minutes x 1 week). Negative for chest pain.   Gastrointestinal:  Negative for abdominal pain, blood in stool, diarrhea, nausea and vomiting.   Genitourinary:  Negative for dysuria.   Neurological:  Positive for headaches.        Current Outpatient Medications   Medication Sig Dispense Refill   • albuterol (PROVENTIL HFA,VENTOLIN HFA) 90 mcg/act inhaler      • cetirizine (ZyrTEC) 10 mg tablet Take 10 mg by mouth daily     • gabapentin (NEURONTIN) 300 mg capsule Take 1 capsule by mouth in the morning and, take 3 capsules by mouth at bedtime. 360 capsule 1   • lisinopril (ZESTRIL) 20 mg tablet Take 1 tablet by mouth daily. 90 tablet 1   • naproxen (Naprosyn) 500 mg tablet Take 1 tablet (500 mg total) by mouth 2 (two) times a day with meals 30 tablet 0   • tamoxifen (NOLVADEX) 20 mg tablet Take 20 mg by mouth daily at bedtime     • venlafaxine (EFFEXOR-XR) 150 mg 24  "hr capsule Take 1 capsule (150 mg total) by mouth every evening 30 capsule 5     No current facility-administered medications for this visit.       Objective:  /72   Pulse (!) 111   Temp 98.7 °F (37.1 °C) (Tympanic)   Resp 16   Ht 5' 7\" (1.702 m)   Wt 93.4 kg (206 lb)   SpO2 97% Comment: on RA  BMI 32.26 kg/m²    Wt Readings from Last 3 Encounters:   04/03/25 93.4 kg (206 lb)   11/13/24 91.4 kg (201 lb 9.6 oz)   11/11/24 93.2 kg (205 lb 7.5 oz)      BP Readings from Last 3 Encounters:   04/03/25 108/72   11/13/24 118/72   11/11/24 140/70          Physical Exam  Vitals and nursing note reviewed.   Constitutional:       General: She is not in acute distress.     Appearance: Normal appearance. She is well-developed. She is obese. She is not ill-appearing or toxic-appearing.   HENT:      Head: Normocephalic and atraumatic.   Eyes:      Conjunctiva/sclera: Conjunctivae normal.   Neck:      Thyroid: No thyromegaly.      Vascular: No carotid bruit.   Cardiovascular:      Rate and Rhythm: Normal rate and regular rhythm.      Pulses: Normal pulses.      Heart sounds: Normal heart sounds.   Pulmonary:      Effort: Pulmonary effort is normal.      Breath sounds: Normal breath sounds.   Musculoskeletal:         General: No swelling or tenderness.      Cervical back: Neck supple.      Right lower leg: No edema.      Left lower leg: No edema.   Lymphadenopathy:      Cervical: No cervical adenopathy.   Neurological:      General: No focal deficit present.      Mental Status: She is alert and oriented to person, place, and time.      Cranial Nerves: No cranial nerve deficit.      Sensory: No sensory deficit.      Motor: No weakness.      Coordination: Coordination normal.      Deep Tendon Reflexes: Reflexes normal.   Psychiatric:         Mood and Affect: Mood normal.         Behavior: Behavior normal.         Thought Content: Thought content normal.         Judgment: Judgment normal.         Lab Results:      Lab " "Results   Component Value Date    WBC 9.06 11/11/2024    HGB 13.3 11/11/2024    HCT 42.6 11/11/2024     11/11/2024    TRIG 246 (H) 11/11/2024    HDL 52 11/11/2024    ALT 13 11/11/2024    AST 15 11/11/2024    K 4.2 11/11/2024     11/11/2024    CREATININE 0.73 11/11/2024    BUN 12 11/11/2024    CO2 32 11/11/2024    GLUF 93 11/11/2024     No results found for: \"URICACID\"  Invalid input(s): \"BASENAME\" Vitamin D    No results found.     POCT Labs                       "

## 2025-04-03 NOTE — ASSESSMENT & PLAN NOTE
Depression Screening Follow-up Plan: Patient's depression screening was positive with a PHQ-9 score of 9. Continue regular follow-up with their psychologist/therapist/psychiatrist who is managing their mental health condition(s).       Stable.  Management per Psych.  HA may benefit by increasing Effexor XR 150mg QD per Psych?     Occupational Therapy Visit      Visit Type: Daily Treatment Note  Visit: 2  Referring Provider: Micheline Duncan MD  Medical Diagnosis (from order): Diagnosis Information    Diagnosis  438.9 (ICD-9-CM) - I69.30 (ICD-10-CM) - History of stroke with residual effects         SUBJECTIVE                                                                                                               Patient verbally consented to allow observer present during session.    \"I pay more attention to myself and walk slowly so I don't fall\"    Pain / Symptoms  - Patient denies pain / symptoms.      OBJECTIVE                                                                                                                                     Treatment     Gait belt applied and used throughout session.  Therapeutic Exercise  NuStep for 8 mins with SPM above 60 to improve endurance.     Therapeutic Activity  Pt engaged in reaching for bean bags placed on different heights with LUE to improve reaching and balance. Pt requires cues for proper body mechanics and keeping RW within FARIHA throughout the session. Pt requires education for proper safety strategies while ambulating to minimize fall risk. Pt demonstrates no difficulty reaching with LUE with unilateral support on walker as well as reaching across midline.     Activities of Daily Living/Self Care  Pt transferred from w/c<>toilet with CGA and RW for safety awareness. Pt requires cues for keeping the RW within FARIHA as well as proper feet placement. Pt required additional time to use the bathroom therefore session limited. Pt utilized grab bars to doff pants and briefs in stance with SBA. Pt utilized grab bar for unilateral support with R hand while pulling briefs and pants over stacy hips using L hand.  Pt also stood at sink with SBA to perform hand hygiene.     Skilled input: verbal instruction/cues and tactile instruction/cues    Writer verbally educated and received verbal consent for  hand placement, positioning of patient, and techniques to be performed today from patient for therapist position for techniques and hand placement and palpation for techniques as described above and how they are pertinent to the patient's plan of care.        ASSESSMENT                                                                                                            Session focused on transfers, endurance, toileting, and functional mobility to reduce caregiver burden. Pt benefits from fall prevention education throughout the session as well as proper safety techniques for transfers. No pain reported throughout session. Overall pt tolerated session well. Recommend OT continue with skilled POC.   Pain/symptoms after session (out of 10): 0  Education:   - Results of above outlined education: Verbalizes understanding and Demonstrates understanding    PLAN                                                                                                                           Suggestions for next session as indicated: Progress per plan of care       Therapy procedure time and total treatment time can be found documented on the Time Entry flowsheet

## 2025-04-03 NOTE — ASSESSMENT & PLAN NOTE
Stable.  Check blood pressure outside of office.  Recommend lifestyle modifications.    Orders:  •  POCT ECG  •  CBC and differential; Future  •  Comprehensive metabolic panel; Future  •  Magnesium; Future  •  Echo complete w/ contrast if indicated; Future

## 2025-04-03 NOTE — PATIENT INSTRUCTIONS
For headache and migraine prevention I would suggest a combination vitamin supplement which may include 1 or more of the following ingredients: Magnesium citrate 300 mg twice a day, Riboflavin (vitamin B2) 400 - 600 mg,  Butterbur 150 mg (PA free). Other ingredients that may be helpful are feverfew, coenzyme Q10 100 mg three times a day,  melatonin and bandar.  Some example brands that combine some of these ingredients are Migravent or Dolovent.     Check blood pressure outside of office.    Proper Blood Pressure Cuff Use:      Sit in a chair with back support, with both feet flat on the floor, for at least 5 minutes prior to measuring blood pressure.  Take 3 different blood pressure readings.  The 1st blood pressure reading will usually be the highest.  Use an average of these 3 readings as your working blood pressure.  Normal blood pressure is considered to be less than 130/80.  Please schedule appointment to evaluate blood pressure if your blood pressure readings are consistently high.

## 2025-04-08 ENCOUNTER — RESULTS FOLLOW-UP (OUTPATIENT)
Dept: FAMILY MEDICINE CLINIC | Facility: CLINIC | Age: 55
End: 2025-04-08

## 2025-04-08 ENCOUNTER — HOSPITAL ENCOUNTER (OUTPATIENT)
Dept: NON INVASIVE DIAGNOSTICS | Facility: HOSPITAL | Age: 55
Discharge: HOME | End: 2025-04-08
Payer: COMMERCIAL

## 2025-04-08 VITALS
WEIGHT: 206 LBS | SYSTOLIC BLOOD PRESSURE: 108 MMHG | DIASTOLIC BLOOD PRESSURE: 72 MMHG | HEIGHT: 67 IN | HEART RATE: 106 BPM | BODY MASS INDEX: 32.33 KG/M2

## 2025-04-08 DIAGNOSIS — I10 PRIMARY HYPERTENSION: ICD-10-CM

## 2025-04-08 DIAGNOSIS — R94.31 ABNORMAL EKG: ICD-10-CM

## 2025-04-08 DIAGNOSIS — R00.0 TACHYCARDIA: ICD-10-CM

## 2025-04-08 DIAGNOSIS — R00.2 PALPITATIONS: ICD-10-CM

## 2025-04-08 LAB
AORTIC ROOT: 2.8 CM
ASCENDING AORTA: 3 CM
BSA FOR ECHO PROCEDURE: 2.05 M2
E WAVE DECELERATION TIME: 118 MS
E/A RATIO: 0.72
FRACTIONAL SHORTENING: 30 (ref 28–44)
INTERVENTRICULAR SEPTUM IN DIASTOLE (PARASTERNAL SHORT AXIS VIEW): 1 CM
INTERVENTRICULAR SEPTUM: 1 CM (ref 0.6–1.1)
LAAS-AP2: 13.6 CM2
LAAS-AP4: 11.7 CM2
LEFT ATRIUM SIZE: 2.8 CM
LEFT ATRIUM VOLUME (MOD BIPLANE): 28 ML
LEFT ATRIUM VOLUME INDEX (MOD BIPLANE): 13.7 ML/M2
LEFT INTERNAL DIMENSION IN SYSTOLE: 2.6 CM (ref 2.1–4)
LEFT VENTRICULAR INTERNAL DIMENSION IN DIASTOLE: 3.7 CM (ref 3.5–6)
LEFT VENTRICULAR POSTERIOR WALL IN END DIASTOLE: 0.8 CM
LEFT VENTRICULAR STROKE VOLUME: 36 ML
LV EF US.2D.A4C+ESTIMATED: 59 %
LVSV (TEICH): 36 ML
MV E'TISSUE VEL-LAT: 12 CM/S
MV E'TISSUE VEL-SEP: 10 CM/S
MV PEAK A VEL: 0.82 M/S
MV PEAK E VEL: 59 CM/S
MV STENOSIS PRESSURE HALF TIME: 34 MS
MV VALVE AREA P 1/2 METHOD: 6.47
RIGHT ATRIUM AREA SYSTOLE A4C: 9.7 CM2
RIGHT VENTRICLE ID DIMENSION: 3 CM
SL CV LEFT ATRIUM LENGTH A2C: 4.3 CM
SL CV LV EF: 60
SL CV PED ECHO LEFT VENTRICLE DIASTOLIC VOLUME (MOD BIPLANE) 2D: 60 ML
SL CV PED ECHO LEFT VENTRICLE SYSTOLIC VOLUME (MOD BIPLANE) 2D: 24 ML
TRICUSPID ANNULAR PLANE SYSTOLIC EXCURSION: 2.2 CM

## 2025-04-08 PROCEDURE — 93306 TTE W/DOPPLER COMPLETE: CPT | Performed by: STUDENT IN AN ORGANIZED HEALTH CARE EDUCATION/TRAINING PROGRAM

## 2025-04-08 PROCEDURE — 93306 TTE W/DOPPLER COMPLETE: CPT

## 2025-04-08 NOTE — RESULT ENCOUNTER NOTE
Echocardiogram shows good squeezing and relaxing function of the heart.  Valves are normal.      Continue plan of care.

## 2025-04-17 ENCOUNTER — HOSPITAL ENCOUNTER (OUTPATIENT)
Dept: NON INVASIVE DIAGNOSTICS | Facility: HOSPITAL | Age: 55
Discharge: HOME | End: 2025-04-17
Attending: FAMILY MEDICINE
Payer: COMMERCIAL

## 2025-04-17 DIAGNOSIS — F32.A DEPRESSION: ICD-10-CM

## 2025-04-17 DIAGNOSIS — C50.811 MALIGNANT NEOPLASM OF OVERLAPPING SITES OF BOTH BREASTS IN FEMALE, ESTROGEN RECEPTOR POSITIVE (HCC): ICD-10-CM

## 2025-04-17 DIAGNOSIS — I10 PRIMARY HYPERTENSION: ICD-10-CM

## 2025-04-17 DIAGNOSIS — G89.28 CHRONIC POST-OPERATIVE PAIN: ICD-10-CM

## 2025-04-17 DIAGNOSIS — R00.2 PALPITATIONS: ICD-10-CM

## 2025-04-17 DIAGNOSIS — G89.3 CANCER RELATED PAIN: ICD-10-CM

## 2025-04-17 DIAGNOSIS — G47.00 INSOMNIA: ICD-10-CM

## 2025-04-17 DIAGNOSIS — Z51.5 PALLIATIVE CARE PATIENT: ICD-10-CM

## 2025-04-17 DIAGNOSIS — F41.9 ANXIOUSNESS: ICD-10-CM

## 2025-04-17 DIAGNOSIS — C50.812 MALIGNANT NEOPLASM OF OVERLAPPING SITES OF BOTH BREASTS IN FEMALE, ESTROGEN RECEPTOR POSITIVE (HCC): ICD-10-CM

## 2025-04-17 DIAGNOSIS — R00.0 TACHYCARDIA: ICD-10-CM

## 2025-04-17 DIAGNOSIS — Z17.0 MALIGNANT NEOPLASM OF OVERLAPPING SITES OF BOTH BREASTS IN FEMALE, ESTROGEN RECEPTOR POSITIVE (HCC): ICD-10-CM

## 2025-04-17 PROCEDURE — 93226 XTRNL ECG REC<48 HR SCAN A/R: CPT

## 2025-04-17 PROCEDURE — 93225 XTRNL ECG REC<48 HRS REC: CPT

## 2025-04-17 RX ORDER — LISINOPRIL 20 MG/1
20 TABLET ORAL DAILY
Qty: 90 TABLET | Refills: 1 | Status: SHIPPED | OUTPATIENT
Start: 2025-04-17

## 2025-04-17 RX ORDER — GABAPENTIN 300 MG/1
CAPSULE ORAL
Qty: 360 CAPSULE | Refills: 1 | Status: SHIPPED | OUTPATIENT
Start: 2025-04-17

## 2025-04-17 NOTE — TELEPHONE ENCOUNTER
Last appointment:11/11/24    Next scheduled appointment:     Pharmacy: [facility]

## 2025-04-27 NOTE — RESULT ENCOUNTER NOTE
24-hour Holter monitor shows predominantly sinus rhythm, with an average heart rate of 98 bpm.  There is a mildly increased frequency of premature ventricular contractions, which are benign extra beats.  There are no prolonged abnormal heart rhythms noted.  There are no significant pauses or decreased electrical conduction of the heart.    Patient needs follow-up appointment with PCP Dr. Garcia -  Please schedule -     Return in about 6 weeks (around 5/15/2025) for With PCP Dr. Garcia - Physical / F/U CC.    Message sent to patient via [employer] patient portal.    Nurse to also call patient.

## 2025-04-29 ENCOUNTER — OFFICE VISIT (OUTPATIENT)
Dept: PALLIATIVE MEDICINE | Facility: CLINIC | Age: 55
End: 2025-04-29
Payer: COMMERCIAL

## 2025-04-29 VITALS
TEMPERATURE: 97.6 F | BODY MASS INDEX: 32.28 KG/M2 | WEIGHT: 205.69 LBS | HEART RATE: 107 BPM | HEIGHT: 67 IN | OXYGEN SATURATION: 96 % | DIASTOLIC BLOOD PRESSURE: 70 MMHG | SYSTOLIC BLOOD PRESSURE: 126 MMHG

## 2025-04-29 DIAGNOSIS — Z17.0 MALIGNANT NEOPLASM OF OVERLAPPING SITES OF BOTH BREASTS IN FEMALE, ESTROGEN RECEPTOR POSITIVE (HCC): ICD-10-CM

## 2025-04-29 DIAGNOSIS — G89.3 CANCER RELATED PAIN: ICD-10-CM

## 2025-04-29 DIAGNOSIS — C79.51 METASTASIS TO BONE (HCC): Primary | ICD-10-CM

## 2025-04-29 DIAGNOSIS — G47.00 INSOMNIA: ICD-10-CM

## 2025-04-29 DIAGNOSIS — G89.28 CHRONIC POST-OPERATIVE PAIN: ICD-10-CM

## 2025-04-29 DIAGNOSIS — F32.A DEPRESSION, UNSPECIFIED DEPRESSION TYPE: ICD-10-CM

## 2025-04-29 DIAGNOSIS — F41.9 ANXIOUSNESS: ICD-10-CM

## 2025-04-29 DIAGNOSIS — F41.9 ANXIETY: ICD-10-CM

## 2025-04-29 DIAGNOSIS — Z79.899 MEDICAL MARIJUANA USE: ICD-10-CM

## 2025-04-29 DIAGNOSIS — C50.811 MALIGNANT NEOPLASM OF OVERLAPPING SITES OF BOTH BREASTS IN FEMALE, ESTROGEN RECEPTOR POSITIVE (HCC): ICD-10-CM

## 2025-04-29 DIAGNOSIS — F32.A DEPRESSION: ICD-10-CM

## 2025-04-29 DIAGNOSIS — Z51.5 PALLIATIVE CARE PATIENT: ICD-10-CM

## 2025-04-29 DIAGNOSIS — C50.812 MALIGNANT NEOPLASM OF OVERLAPPING SITES OF BOTH BREASTS IN FEMALE, ESTROGEN RECEPTOR POSITIVE (HCC): ICD-10-CM

## 2025-04-29 PROCEDURE — 99213 OFFICE O/P EST LOW 20 MIN: CPT | Performed by: INTERNAL MEDICINE

## 2025-04-29 RX ORDER — GABAPENTIN 300 MG/1
CAPSULE ORAL
Qty: 360 CAPSULE | Refills: 1 | Status: SHIPPED | OUTPATIENT
Start: 2025-04-29

## 2025-04-29 RX ORDER — VENLAFAXINE HYDROCHLORIDE 150 MG/1
150 CAPSULE, EXTENDED RELEASE ORAL EVERY EVENING
Qty: 30 CAPSULE | Refills: 5 | Status: SHIPPED | OUTPATIENT
Start: 2025-04-29

## 2025-04-29 NOTE — ASSESSMENT & PLAN NOTE
Continue Gabapentin   Orders:    gabapentin (NEURONTIN) 300 mg capsule; Take 1 capsule by mouth in the morning and, take 3 capsules by mouth at bedtime.    venlafaxine (EFFEXOR-XR) 150 mg 24 hr capsule; Take 1 capsule (150 mg total) by mouth every evening

## 2025-04-29 NOTE — ASSESSMENT & PLAN NOTE
Time spent with patient providing supportive listening.   All questions and concerns were addressed to their satisfaction.    RTC:in November for MMJ recertification    Orders:    gabapentin (NEURONTIN) 300 mg capsule; Take 1 capsule by mouth in the morning and, take 3 capsules by mouth at bedtime.    venlafaxine (EFFEXOR-XR) 150 mg 24 hr capsule; Take 1 capsule (150 mg total) by mouth every evening

## 2025-04-29 NOTE — ASSESSMENT & PLAN NOTE
Orders:    venlafaxine (EFFEXOR-XR) 150 mg 24 hr capsule; Take 1 capsule (150 mg total) by mouth every evening

## 2025-04-29 NOTE — PROGRESS NOTES
Name: Magui Wren      : 1970      MRN: [id number]  Encounter Provider: Karen Cummings DO  Encounter Date: 2025   Encounter department: [facility] [place]  :  Assessment & Plan  Malignant neoplasm of overlapping sites of both breasts in female, estrogen receptor positive (HCC)    Orders:    gabapentin (NEURONTIN) 300 mg capsule; Take 1 capsule by mouth in the morning and, take 3 capsules by mouth at bedtime.    venlafaxine (EFFEXOR-XR) 150 mg 24 hr capsule; Take 1 capsule (150 mg total) by mouth every evening    Cancer related pain  Continue Gabapentin   Orders:    gabapentin (NEURONTIN) 300 mg capsule; Take 1 capsule by mouth in the morning and, take 3 capsules by mouth at bedtime.    venlafaxine (EFFEXOR-XR) 150 mg 24 hr capsule; Take 1 capsule (150 mg total) by mouth every evening      Palliative care patient  Time spent with patient providing supportive listening.   All questions and concerns were addressed to their satisfaction.    RTC:in November for MMJ recertification    Orders:    gabapentin (NEURONTIN) 300 mg capsule; Take 1 capsule by mouth in the morning and, take 3 capsules by mouth at bedtime.    venlafaxine (EFFEXOR-XR) 150 mg 24 hr capsule; Take 1 capsule (150 mg total) by mouth every evening      Chronic post-operative pain    Orders:    gabapentin (NEURONTIN) 300 mg capsule; Take 1 capsule by mouth in the morning and, take 3 capsules by mouth at bedtime.    venlafaxine (EFFEXOR-XR) 150 mg 24 hr capsule; Take 1 capsule (150 mg total) by mouth every evening    Depression    Orders:    gabapentin (NEURONTIN) 300 mg capsule; Take 1 capsule by mouth in the morning and, take 3 capsules by mouth at bedtime.    Anxiousness    Orders:    gabapentin (NEURONTIN) 300 mg capsule; Take 1 capsule by mouth in the morning and, take 3 capsules by mouth at bedtime.    Insomnia    Orders:    gabapentin (NEURONTIN) 300 mg capsule; Take 1 capsule by mouth in the morning and,  take 3 capsules by mouth at bedtime.    venlafaxine (EFFEXOR-XR) 150 mg 24 hr capsule; Take 1 capsule (150 mg total) by mouth every evening    Anxiety    Orders:    venlafaxine (EFFEXOR-XR) 150 mg 24 hr capsule; Take 1 capsule (150 mg total) by mouth every evening    Depression, unspecified depression type    Orders:    venlafaxine (EFFEXOR-XR) 150 mg 24 hr capsule; Take 1 capsule (150 mg total) by mouth every evening    Metastasis to bone (HCC)         Medical marijuana use             Decisional apparatus: Patient is competent on my exam today. If competence is lost, patient's substitute decision maker would default to adult son by [place].   Advance Directive / Living Will / POLST: yes     PDMP Review: I have reviewed the patient's controlled substance dispensing history in the Prescription Drug Monitoring Program in compliance with the [facility] regulations before prescribing any controlled substances.    History of Present Illness   Magui Wren is a 54 y.o. female who presents in follow-up for symptoms related to a palliative care diagnosis of metastatic breast cancer.  Patient has continued to do very well and is working full-time at [employer].  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  She notes some ongoing joint pains attributes this to long work hours and hard work.  She is using the gabapentin with good relief.  She is also continuing to use medical marijuana and getting good relief from this as well.  She currently is very satisfied with her quality of life.  She is planning to meet with weight management as she is noticing that despite a lot of movement and exercise she is still not able to lose weight.  She is also following up with cardiology at the end the week due to some concerns for sinus tachycardia and abnormal echo, she was once told  "that she had had a cardiac problem when she was an infant as she was premature, unfortunately her parents are  and her aunt cannot quite remember what was told to her and no records exist at this time.  Patient states that she will plan to follow-up in November when she is due for her medical marijuana recertification and she understands she can call with any questions or concerns..  Medical History Reviewed by provider this encounter:  Tobacco  Allergies  Meds  Problems  Med Hx  Surg Hx  Fam Hx     .     Objective   /70 (BP Location: Right arm, Patient Position: Sitting, Cuff Size: Large)   Pulse (!) 107   Temp 97.6 °F (36.4 °C) (Temporal)   Ht 5' 7\" (1.702 m)   Wt 93.3 kg (205 lb 11 oz)   SpO2 96%   BMI 32.22 kg/m²     Physical Exam  Vitals and nursing note reviewed.   Constitutional:       General: She is not in acute distress.     Appearance: Normal appearance. She is not ill-appearing.   HENT:      Head: Normocephalic and atraumatic.      Right Ear: External ear normal.      Left Ear: External ear normal.   Eyes:      General:         Right eye: No discharge.         Left eye: No discharge.   Pulmonary:      Effort: Pulmonary effort is normal. No respiratory distress.   Abdominal:      General: There is no distension.      Palpations: Abdomen is soft.   Skin:     Coloration: Skin is pale.   Neurological:      Mental Status: She is oriented to person, place, and time. Mental status is at baseline.   Psychiatric:         Mood and Affect: Mood normal.         Behavior: Behavior normal.         Thought Content: Thought content normal.         Judgment: Judgment normal.         Recent labs:  Lab Results   Component Value Date/Time    SODIUM 141 2024 06:34 AM    K 4.2 2024 06:34 AM    BUN 12 2024 06:34 AM    CREATININE 0.73 2024 06:34 AM    GLUC 142 (H) 2024 09:59 AM    CALCIUM 9.4 2024 06:34 AM    AST 15 2024 06:34 AM    ALT 13 2024 06:34 AM "    ALB 4.3 11/11/2024 06:34 AM    TP 7.3 11/11/2024 06:34 AM    EGFR 94 11/11/2024 06:34 AM     Lab Results   Component Value Date/Time    HGB 13.3 11/11/2024 06:34 AM    WBC 9.06 11/11/2024 06:34 AM     11/11/2024 06:34 AM     Lab Results   Component Value Date/Time    PBZ0YGOALTGT 2.080 11/11/2024 06:34 AM       Recent Imaging:  Procedure: Holter monitor  Result Date: 4/27/2025  Impression: Predominantly sinus rhythm, with an average heart rate of 98 beats per minute Mildly increased frequency of premature ventricular contractions. No sustained arrhythmias noted. No significant pauses or advanced degree heart block Rachid Martini MD    Procedure: XR chest 1 view  Result Date: 1/21/2025  Impression: Emphysematous changes.  No focal consolidation, pleural effusion, or pneumothorax. Resident: Narciso Alatorre I, the attending radiologist, have reviewed the images and agree with the final report above. Workstation performed: [id number]       Administrative Statements   I have spent a total time of 20 minutes in caring for this patient on the day of the visit/encounter including Risks and benefits of tx options, Instructions for management, Patient and family education, Importance of tx compliance, Risk factor reductions, Impressions, Counseling / Coordination of care, Documenting in the medical record, Reviewing/placing orders in the medical record (including tests, medications, and/or procedures), and Obtaining or reviewing history  .   Topics discussed with the patient / family include symptom assessment and management, medication review, psychosocial support, medical marijuana, supportive listening, and anticipatory guidance.

## 2025-04-29 NOTE — ASSESSMENT & PLAN NOTE
Orders:    gabapentin (NEURONTIN) 300 mg capsule; Take 1 capsule by mouth in the morning and, take 3 capsules by mouth at bedtime.

## 2025-04-29 NOTE — ASSESSMENT & PLAN NOTE
Orders:    gabapentin (NEURONTIN) 300 mg capsule; Take 1 capsule by mouth in the morning and, take 3 capsules by mouth at bedtime.    venlafaxine (EFFEXOR-XR) 150 mg 24 hr capsule; Take 1 capsule (150 mg total) by mouth every evening

## 2025-04-30 ENCOUNTER — APPOINTMENT (OUTPATIENT)
Dept: LAB | Facility: CLINIC | Age: 55
End: 2025-04-30
Payer: COMMERCIAL

## 2025-04-30 DIAGNOSIS — R00.0 TACHYCARDIA: ICD-10-CM

## 2025-04-30 DIAGNOSIS — R00.2 PALPITATIONS: ICD-10-CM

## 2025-04-30 DIAGNOSIS — I10 PRIMARY HYPERTENSION: ICD-10-CM

## 2025-04-30 LAB
ALBUMIN SERPL BCG-MCNC: 4.4 G/DL (ref 3.5–5)
ALP SERPL-CCNC: 81 U/L (ref 34–104)
ALT SERPL W P-5'-P-CCNC: 13 U/L (ref 7–52)
ANION GAP SERPL CALCULATED.3IONS-SCNC: 7 MMOL/L (ref 4–13)
AST SERPL W P-5'-P-CCNC: 15 U/L (ref 13–39)
BASOPHILS # BLD AUTO: 0.07 THOUSANDS/ÂΜL (ref 0–0.1)
BASOPHILS NFR BLD AUTO: 1 % (ref 0–1)
BILIRUB SERPL-MCNC: 0.2 MG/DL (ref 0.2–1)
BUN SERPL-MCNC: 13 MG/DL (ref 5–25)
CALCIUM SERPL-MCNC: 9.6 MG/DL (ref 8.4–10.2)
CHLORIDE SERPL-SCNC: 105 MMOL/L (ref 96–108)
CO2 SERPL-SCNC: 32 MMOL/L (ref 21–32)
CREAT SERPL-MCNC: 0.77 MG/DL (ref 0.6–1.3)
EOSINOPHIL # BLD AUTO: 0.11 THOUSAND/ÂΜL (ref 0–0.61)
EOSINOPHIL NFR BLD AUTO: 1 % (ref 0–6)
ERYTHROCYTE [DISTWIDTH] IN BLOOD BY AUTOMATED COUNT: 12.6 % (ref 11.6–15.1)
GFR SERPL CREATININE-BSD FRML MDRD: 88 ML/MIN/1.73SQ M
GLUCOSE SERPL-MCNC: 117 MG/DL (ref 65–140)
HCT VFR BLD AUTO: 41.5 % (ref 34.8–46.1)
HGB BLD-MCNC: 12.9 G/DL (ref 11.5–15.4)
IMM GRANULOCYTES # BLD AUTO: 0.04 THOUSAND/UL (ref 0–0.2)
IMM GRANULOCYTES NFR BLD AUTO: 0 % (ref 0–2)
LYMPHOCYTES # BLD AUTO: 2.77 THOUSANDS/ÂΜL (ref 0.6–4.47)
LYMPHOCYTES NFR BLD AUTO: 23 % (ref 14–44)
MAGNESIUM SERPL-MCNC: 2.2 MG/DL (ref 1.9–2.7)
MCH RBC QN AUTO: 29.4 PG (ref 26.8–34.3)
MCHC RBC AUTO-ENTMCNC: 31.1 G/DL (ref 31.4–37.4)
MCV RBC AUTO: 95 FL (ref 82–98)
MONOCYTES # BLD AUTO: 0.97 THOUSAND/ÂΜL (ref 0.17–1.22)
MONOCYTES NFR BLD AUTO: 8 % (ref 4–12)
NEUTROPHILS # BLD AUTO: 8.07 THOUSANDS/ÂΜL (ref 1.85–7.62)
NEUTS SEG NFR BLD AUTO: 67 % (ref 43–75)
NRBC BLD AUTO-RTO: 0 /100 WBCS
PLATELET # BLD AUTO: 342 THOUSANDS/UL (ref 149–390)
PMV BLD AUTO: 8.6 FL (ref 8.9–12.7)
POTASSIUM SERPL-SCNC: 4.1 MMOL/L (ref 3.5–5.3)
PROT SERPL-MCNC: 7.3 G/DL (ref 6.4–8.4)
RBC # BLD AUTO: 4.39 MILLION/UL (ref 3.81–5.12)
SODIUM SERPL-SCNC: 144 MMOL/L (ref 135–147)
TSH SERPL DL<=0.05 MIU/L-ACNC: 1.46 UIU/ML (ref 0.45–4.5)
WBC # BLD AUTO: 12.03 THOUSAND/UL (ref 4.31–10.16)

## 2025-04-30 PROCEDURE — 36415 COLL VENOUS BLD VENIPUNCTURE: CPT

## 2025-04-30 PROCEDURE — 83735 ASSAY OF MAGNESIUM: CPT

## 2025-04-30 PROCEDURE — 84443 ASSAY THYROID STIM HORMONE: CPT

## 2025-04-30 PROCEDURE — 85025 COMPLETE CBC W/AUTO DIFF WBC: CPT

## 2025-04-30 PROCEDURE — 80053 COMPREHEN METABOLIC PANEL: CPT

## 2025-05-01 NOTE — RESULT ENCOUNTER NOTE
Slightly elevated white blood cell count and neutrophils may indicated a viral infection.      Other resulted labs are stable.      Message sent to patient via [employer] patient portal.

## 2025-05-05 DIAGNOSIS — G89.28 CHRONIC POST-OPERATIVE PAIN: ICD-10-CM

## 2025-05-05 DIAGNOSIS — G47.00 INSOMNIA: ICD-10-CM

## 2025-05-05 DIAGNOSIS — G89.3 CANCER RELATED PAIN: ICD-10-CM

## 2025-05-05 DIAGNOSIS — C50.812 MALIGNANT NEOPLASM OF OVERLAPPING SITES OF BOTH BREASTS IN FEMALE, ESTROGEN RECEPTOR POSITIVE (HCC): ICD-10-CM

## 2025-05-05 DIAGNOSIS — F32.A DEPRESSION: ICD-10-CM

## 2025-05-05 DIAGNOSIS — Z51.5 PALLIATIVE CARE PATIENT: ICD-10-CM

## 2025-05-05 DIAGNOSIS — C50.811 MALIGNANT NEOPLASM OF OVERLAPPING SITES OF BOTH BREASTS IN FEMALE, ESTROGEN RECEPTOR POSITIVE (HCC): ICD-10-CM

## 2025-05-05 DIAGNOSIS — F41.9 ANXIOUSNESS: ICD-10-CM

## 2025-05-05 DIAGNOSIS — Z17.0 MALIGNANT NEOPLASM OF OVERLAPPING SITES OF BOTH BREASTS IN FEMALE, ESTROGEN RECEPTOR POSITIVE (HCC): ICD-10-CM

## 2025-05-06 DIAGNOSIS — G89.28 CHRONIC POST-OPERATIVE PAIN: ICD-10-CM

## 2025-05-06 DIAGNOSIS — C50.812 MALIGNANT NEOPLASM OF OVERLAPPING SITES OF BOTH BREASTS IN FEMALE, ESTROGEN RECEPTOR POSITIVE (HCC): ICD-10-CM

## 2025-05-06 DIAGNOSIS — C50.811 MALIGNANT NEOPLASM OF OVERLAPPING SITES OF BOTH BREASTS IN FEMALE, ESTROGEN RECEPTOR POSITIVE (HCC): ICD-10-CM

## 2025-05-06 DIAGNOSIS — Z51.5 PALLIATIVE CARE PATIENT: ICD-10-CM

## 2025-05-06 DIAGNOSIS — F41.9 ANXIETY: ICD-10-CM

## 2025-05-06 DIAGNOSIS — F32.A DEPRESSION, UNSPECIFIED DEPRESSION TYPE: ICD-10-CM

## 2025-05-06 DIAGNOSIS — Z17.0 MALIGNANT NEOPLASM OF OVERLAPPING SITES OF BOTH BREASTS IN FEMALE, ESTROGEN RECEPTOR POSITIVE (HCC): ICD-10-CM

## 2025-05-06 DIAGNOSIS — G89.3 CANCER RELATED PAIN: ICD-10-CM

## 2025-05-06 DIAGNOSIS — G47.00 INSOMNIA: ICD-10-CM

## 2025-05-06 RX ORDER — GABAPENTIN 300 MG/1
CAPSULE ORAL
Qty: 360 CAPSULE | Refills: 0 | OUTPATIENT
Start: 2025-05-06

## 2025-05-06 RX ORDER — VENLAFAXINE HYDROCHLORIDE 150 MG/1
150 CAPSULE, EXTENDED RELEASE ORAL EVERY EVENING
Qty: 30 CAPSULE | Refills: 0 | Status: CANCELLED | OUTPATIENT
Start: 2025-05-06

## 2025-05-07 ENCOUNTER — TELEMEDICINE (OUTPATIENT)
Dept: FAMILY MEDICINE CLINIC | Facility: CLINIC | Age: 55
End: 2025-05-07
Payer: COMMERCIAL

## 2025-05-07 DIAGNOSIS — R00.2 PALPITATIONS: ICD-10-CM

## 2025-05-07 DIAGNOSIS — D72.829 LEUKOCYTOSIS, UNSPECIFIED TYPE: ICD-10-CM

## 2025-05-07 DIAGNOSIS — R00.0 TACHYCARDIA: Primary | ICD-10-CM

## 2025-05-07 PROCEDURE — 99213 OFFICE O/P EST LOW 20 MIN: CPT | Performed by: FAMILY MEDICINE

## 2025-05-07 NOTE — PROGRESS NOTES
Virtual Regular VisitName: Magui Wren      : 1970      MRN: [id number]  Encounter Provider: Nati Garcia DO  Encounter Date: 2025   Encounter department: [facility] [place]  :  Assessment & Plan  Tachycardia  Has been mildly tachycardic  Nml echo and reasonable holter, but sx continue and she had no awful sx during the time of the holter  Refer to cardiology for ? Of longer term monitor as pt is concerned    Orders:    Ambulatory Referral to Cardiology; Future    Palpitations  See above    Orders:    Ambulatory Referral to Cardiology; Future    Leukocytosis, unspecified type  Suspect this was transient and not clinically significant  Recheck in a month  Orders:    CBC and differential; Future          History of Present Illness     HPI  Pt presents in f/u for recent heart testing.  Had palps.  Saw Dr Chopra.  EKG shows ? Of atrial enlargement.  Echo and holter ordered for pt.  Pt states she really didn't have much in the way of palps or episodes where her heart was racing on monitor.  No lightheadedness, chest pain, shortness of breath.  Echo nml.  Holter shows pcv's/pac's but nothing worrisome.  Pt wonders what to do next    Had labs at the time of visit.  Wbc's [id number].  Wonders if she needs to do anything about this.  Doesn't feel ill.  No cold sx, belly pain, urinary symptoms.    Review of Systems  See hpi  Objective   There were no vitals taken for this visit.    Physical Exam  Constitutional:       Appearance: Normal appearance.   HENT:      Head: Normocephalic and atraumatic.   Eyes:      Extraocular Movements: Extraocular movements intact.      Conjunctiva/sclera: Conjunctivae normal.   Pulmonary:      Effort: Pulmonary effort is normal. No respiratory distress.   Neurological:      General: No focal deficit present.      Mental Status: She is alert and oriented to person, place, and time.         Administrative Statements   Encounter provider Nati Garcia, DO    The Patient  is located at Home and in the following state in which I hold an active license [place].    The patient was identified by name and date of birth. Magui Wren was informed that this is a telemedicine visit and that the visit is being conducted through the Epic Embedded platform. She agrees to proceed..  My office door was closed. No one else was in the room.  She acknowledged consent and understanding of privacy and security of the video platform. The patient has agreed to participate and understands they can discontinue the visit at any time.    I have spent a total time of 10 minutes in caring for this patient on the day of the visit/encounter including Instructions for management and Documenting in the medical record, not including the time spent for establishing the audio/video connection.

## 2025-05-28 ENCOUNTER — TELEPHONE (OUTPATIENT)
Dept: BARIATRICS | Facility: CLINIC | Age: 55
End: 2025-05-28